# Patient Record
Sex: FEMALE | Race: WHITE | Employment: OTHER
[De-identification: names, ages, dates, MRNs, and addresses within clinical notes are randomized per-mention and may not be internally consistent; named-entity substitution may affect disease eponyms.]

---

## 2017-01-13 RX ORDER — OMEPRAZOLE 20 MG/1
CAPSULE, DELAYED RELEASE ORAL
Qty: 90 CAPSULE | Refills: 1 | Status: SHIPPED | OUTPATIENT
Start: 2017-01-13 | End: 2017-04-24

## 2017-02-01 ENCOUNTER — OFFICE VISIT (OUTPATIENT)
Facility: CLINIC | Age: 67
End: 2017-02-01

## 2017-02-01 VITALS
HEART RATE: 71 BPM | WEIGHT: 172.8 LBS | TEMPERATURE: 97.8 F | DIASTOLIC BLOOD PRESSURE: 88 MMHG | SYSTOLIC BLOOD PRESSURE: 168 MMHG | OXYGEN SATURATION: 97 % | BODY MASS INDEX: 33.75 KG/M2

## 2017-02-01 DIAGNOSIS — R41.3 MEMORY PROBLEM: Primary | ICD-10-CM

## 2017-02-01 DIAGNOSIS — I10 ESSENTIAL HYPERTENSION: Chronic | ICD-10-CM

## 2017-02-01 DIAGNOSIS — E66.9 OBESITY (BMI 30.0-34.9): ICD-10-CM

## 2017-02-01 DIAGNOSIS — E78.5 HYPERLIPIDEMIA, UNSPECIFIED HYPERLIPIDEMIA TYPE: Chronic | ICD-10-CM

## 2017-02-01 DIAGNOSIS — R35.0 URINARY FREQUENCY: ICD-10-CM

## 2017-02-01 DIAGNOSIS — K21.9 GASTROESOPHAGEAL REFLUX DISEASE WITHOUT ESOPHAGITIS: Chronic | ICD-10-CM

## 2017-02-01 DIAGNOSIS — N30.00 ACUTE CYSTITIS WITHOUT HEMATURIA: ICD-10-CM

## 2017-02-01 DIAGNOSIS — E55.9 VITAMIN D DEFICIENCY: ICD-10-CM

## 2017-02-01 LAB
BILIRUBIN, POC: ABNORMAL
BLOOD URINE, POC: ABNORMAL
CLARITY, POC: CLEAR
COLOR, POC: YELLOW
GLUCOSE URINE, POC: ABNORMAL
KETONES, POC: ABNORMAL
LEUKOCYTE EST, POC: ABNORMAL
NITRITE, POC: ABNORMAL
PH, POC: 5
PROTEIN, POC: ABNORMAL
SPECIFIC GRAVITY, POC: 1.01
UROBILINOGEN, POC: ABNORMAL

## 2017-02-01 PROCEDURE — 3017F COLORECTAL CA SCREEN DOC REV: CPT | Performed by: FAMILY MEDICINE

## 2017-02-01 PROCEDURE — 1036F TOBACCO NON-USER: CPT | Performed by: FAMILY MEDICINE

## 2017-02-01 PROCEDURE — G8427 DOCREV CUR MEDS BY ELIG CLIN: HCPCS | Performed by: FAMILY MEDICINE

## 2017-02-01 PROCEDURE — G8400 PT W/DXA NO RESULTS DOC: HCPCS | Performed by: FAMILY MEDICINE

## 2017-02-01 PROCEDURE — G8419 CALC BMI OUT NRM PARAM NOF/U: HCPCS | Performed by: FAMILY MEDICINE

## 2017-02-01 PROCEDURE — 4040F PNEUMOC VAC/ADMIN/RCVD: CPT | Performed by: FAMILY MEDICINE

## 2017-02-01 PROCEDURE — G8484 FLU IMMUNIZE NO ADMIN: HCPCS | Performed by: FAMILY MEDICINE

## 2017-02-01 PROCEDURE — 81002 URINALYSIS NONAUTO W/O SCOPE: CPT | Performed by: FAMILY MEDICINE

## 2017-02-01 PROCEDURE — 3014F SCREEN MAMMO DOC REV: CPT | Performed by: FAMILY MEDICINE

## 2017-02-01 PROCEDURE — 1123F ACP DISCUSS/DSCN MKR DOCD: CPT | Performed by: FAMILY MEDICINE

## 2017-02-01 PROCEDURE — 1090F PRES/ABSN URINE INCON ASSESS: CPT | Performed by: FAMILY MEDICINE

## 2017-02-01 PROCEDURE — 99215 OFFICE O/P EST HI 40 MIN: CPT | Performed by: FAMILY MEDICINE

## 2017-02-01 RX ORDER — NITROFURANTOIN 25; 75 MG/1; MG/1
100 CAPSULE ORAL 2 TIMES DAILY
Qty: 20 CAPSULE | Refills: 0 | Status: SHIPPED | OUTPATIENT
Start: 2017-02-01 | End: 2017-02-11

## 2017-02-10 ENCOUNTER — OFFICE VISIT (OUTPATIENT)
Dept: PSYCHOLOGY | Facility: CLINIC | Age: 67
End: 2017-02-10

## 2017-02-10 DIAGNOSIS — F43.22 ADJUSTMENT DISORDER WITH ANXIETY: Primary | ICD-10-CM

## 2017-02-10 PROCEDURE — 90791 PSYCH DIAGNOSTIC EVALUATION: CPT | Performed by: SOCIAL WORKER

## 2017-02-10 ASSESSMENT — PATIENT HEALTH QUESTIONNAIRE - PHQ9
SUM OF ALL RESPONSES TO PHQ9 QUESTIONS 1 & 2: 2
8. MOVING OR SPEAKING SO SLOWLY THAT OTHER PEOPLE COULD HAVE NOTICED. OR THE OPPOSITE, BEING SO FIGETY OR RESTLESS THAT YOU HAVE BEEN MOVING AROUND A LOT MORE THAN USUAL: 3
6. FEELING BAD ABOUT YOURSELF - OR THAT YOU ARE A FAILURE OR HAVE LET YOURSELF OR YOUR FAMILY DOWN: 0
5. POOR APPETITE OR OVEREATING: 0
4. FEELING TIRED OR HAVING LITTLE ENERGY: 0
2. FEELING DOWN, DEPRESSED OR HOPELESS: 0
SUM OF ALL RESPONSES TO PHQ QUESTIONS 1-9: 10
3. TROUBLE FALLING OR STAYING ASLEEP: 2
1. LITTLE INTEREST OR PLEASURE IN DOING THINGS: 2
7. TROUBLE CONCENTRATING ON THINGS, SUCH AS READING THE NEWSPAPER OR WATCHING TELEVISION: 3
9. THOUGHTS THAT YOU WOULD BE BETTER OFF DEAD, OR OF HURTING YOURSELF: 0
10. IF YOU CHECKED OFF ANY PROBLEMS, HOW DIFFICULT HAVE THESE PROBLEMS MADE IT FOR YOU TO DO YOUR WORK, TAKE CARE OF THINGS AT HOME, OR GET ALONG WITH OTHER PEOPLE: 2

## 2017-02-13 ENCOUNTER — OFFICE VISIT (OUTPATIENT)
Dept: NEUROLOGY | Facility: CLINIC | Age: 67
End: 2017-02-13

## 2017-02-13 VITALS
DIASTOLIC BLOOD PRESSURE: 92 MMHG | BODY MASS INDEX: 30.74 KG/M2 | HEART RATE: 66 BPM | SYSTOLIC BLOOD PRESSURE: 158 MMHG | HEIGHT: 63 IN | WEIGHT: 173.5 LBS

## 2017-02-13 DIAGNOSIS — J38.3 SPASMODIC DYSPHONIA: Primary | ICD-10-CM

## 2017-02-13 DIAGNOSIS — F02.80 DEMENTIA ASSOCIATED WITH OTHER UNDERLYING DISEASE WITHOUT BEHAVIORAL DISTURBANCE (HCC): ICD-10-CM

## 2017-02-13 PROCEDURE — 99204 OFFICE O/P NEW MOD 45 MIN: CPT | Performed by: PSYCHIATRY & NEUROLOGY

## 2017-02-13 PROCEDURE — G8484 FLU IMMUNIZE NO ADMIN: HCPCS | Performed by: PSYCHIATRY & NEUROLOGY

## 2017-02-13 PROCEDURE — G8417 CALC BMI ABV UP PARAM F/U: HCPCS | Performed by: PSYCHIATRY & NEUROLOGY

## 2017-02-13 PROCEDURE — 3014F SCREEN MAMMO DOC REV: CPT | Performed by: PSYCHIATRY & NEUROLOGY

## 2017-02-13 PROCEDURE — 1036F TOBACCO NON-USER: CPT | Performed by: PSYCHIATRY & NEUROLOGY

## 2017-02-13 PROCEDURE — 4040F PNEUMOC VAC/ADMIN/RCVD: CPT | Performed by: PSYCHIATRY & NEUROLOGY

## 2017-02-13 PROCEDURE — G8427 DOCREV CUR MEDS BY ELIG CLIN: HCPCS | Performed by: PSYCHIATRY & NEUROLOGY

## 2017-02-13 PROCEDURE — 3017F COLORECTAL CA SCREEN DOC REV: CPT | Performed by: PSYCHIATRY & NEUROLOGY

## 2017-02-13 PROCEDURE — 1090F PRES/ABSN URINE INCON ASSESS: CPT | Performed by: PSYCHIATRY & NEUROLOGY

## 2017-02-13 PROCEDURE — G8400 PT W/DXA NO RESULTS DOC: HCPCS | Performed by: PSYCHIATRY & NEUROLOGY

## 2017-02-13 PROCEDURE — 1123F ACP DISCUSS/DSCN MKR DOCD: CPT | Performed by: PSYCHIATRY & NEUROLOGY

## 2017-02-21 ENCOUNTER — HOSPITAL ENCOUNTER (OUTPATIENT)
Age: 67
Setting detail: SPECIMEN
Discharge: HOME | End: 2017-02-21
Payer: MEDICARE

## 2017-02-21 ENCOUNTER — HOSPITAL ENCOUNTER (OUTPATIENT)
Dept: MRI IMAGING | Facility: CLINIC | Age: 67
Discharge: HOME | End: 2017-02-21
Payer: MEDICARE

## 2017-02-21 DIAGNOSIS — F02.80 DEMENTIA ASSOCIATED WITH OTHER UNDERLYING DISEASE WITHOUT BEHAVIORAL DISTURBANCE (HCC): ICD-10-CM

## 2017-02-21 LAB
FOLATE: >20 NG/ML
SEDIMENTATION RATE, ERYTHROCYTE: 5 MM (ref 0–20)
T. PALLIDUM, IGG: NONREACTIVE
TSH SERPL DL<=0.05 MIU/L-ACNC: 5.95 MIU/L (ref 0.3–5)
VITAMIN B-12: 557 PG/ML (ref 211–946)

## 2017-02-21 PROCEDURE — 70551 MRI BRAIN STEM W/O DYE: CPT

## 2017-02-24 ENCOUNTER — OFFICE VISIT (OUTPATIENT)
Dept: PSYCHOLOGY | Facility: CLINIC | Age: 67
End: 2017-02-24

## 2017-02-24 DIAGNOSIS — F43.22 ADJUSTMENT DISORDER WITH ANXIETY: Primary | ICD-10-CM

## 2017-02-24 PROCEDURE — 90832 PSYTX W PT 30 MINUTES: CPT | Performed by: SOCIAL WORKER

## 2017-02-27 ENCOUNTER — HOSPITAL ENCOUNTER (OUTPATIENT)
Dept: NEUROLOGY | Age: 67
Discharge: HOME | End: 2017-02-27
Payer: MEDICARE

## 2017-02-27 ENCOUNTER — TELEPHONE (OUTPATIENT)
Facility: CLINIC | Age: 67
End: 2017-02-27

## 2017-02-27 PROCEDURE — 95819 EEG AWAKE AND ASLEEP: CPT

## 2017-03-08 ENCOUNTER — TELEPHONE (OUTPATIENT)
Dept: NEUROLOGY | Facility: CLINIC | Age: 67
End: 2017-03-08

## 2017-03-09 ENCOUNTER — OFFICE VISIT (OUTPATIENT)
Dept: PSYCHOLOGY | Facility: CLINIC | Age: 67
End: 2017-03-09

## 2017-03-09 DIAGNOSIS — F43.22 ADJUSTMENT DISORDER WITH ANXIETY: Primary | ICD-10-CM

## 2017-03-09 DIAGNOSIS — F03.90 DEMENTIA WITHOUT BEHAVIORAL DISTURBANCE, UNSPECIFIED DEMENTIA TYPE: ICD-10-CM

## 2017-03-09 PROCEDURE — 90832 PSYTX W PT 30 MINUTES: CPT | Performed by: SOCIAL WORKER

## 2017-03-16 DIAGNOSIS — F02.80 DEMENTIA ASSOCIATED WITH OTHER UNDERLYING DISEASE WITHOUT BEHAVIORAL DISTURBANCE (HCC): ICD-10-CM

## 2017-03-20 ENCOUNTER — HOSPITAL ENCOUNTER (OUTPATIENT)
Age: 67
Setting detail: SPECIMEN
Discharge: HOME | End: 2017-03-20
Payer: MEDICARE

## 2017-03-20 DIAGNOSIS — R79.89 ELEVATED TSH: ICD-10-CM

## 2017-03-20 LAB
THYROXINE, FREE: 0.95 NG/DL (ref 0.93–1.7)
TSH SERPL DL<=0.05 MIU/L-ACNC: 6.21 MIU/L (ref 0.3–5)

## 2017-03-25 PROBLEM — E03.9 HYPOTHYROIDISM (ACQUIRED): Status: ACTIVE | Noted: 2017-03-25

## 2017-04-13 ENCOUNTER — TELEPHONE (OUTPATIENT)
Dept: NEUROLOGY | Age: 67
End: 2017-04-13

## 2017-04-13 RX ORDER — DONEPEZIL HYDROCHLORIDE 5 MG/1
TABLET, FILM COATED ORAL
Qty: 30 TABLET | Refills: 1 | Status: SHIPPED | OUTPATIENT
Start: 2017-04-13 | End: 2017-06-29 | Stop reason: ALTCHOICE

## 2017-04-24 ENCOUNTER — OFFICE VISIT (OUTPATIENT)
Dept: NEUROLOGY | Age: 67
End: 2017-04-24
Payer: MEDICARE

## 2017-04-24 VITALS
SYSTOLIC BLOOD PRESSURE: 141 MMHG | DIASTOLIC BLOOD PRESSURE: 79 MMHG | HEART RATE: 59 BPM | HEIGHT: 61 IN | WEIGHT: 182 LBS | BODY MASS INDEX: 34.36 KG/M2

## 2017-04-24 DIAGNOSIS — J38.3 SPASMODIC DYSPHONIA: ICD-10-CM

## 2017-04-24 DIAGNOSIS — G30.8 ALZHEIMER'S DISEASE OF OTHER ONSET WITHOUT BEHAVIORAL DISTURBANCE: Primary | ICD-10-CM

## 2017-04-24 DIAGNOSIS — F02.80 ALZHEIMER'S DISEASE OF OTHER ONSET WITHOUT BEHAVIORAL DISTURBANCE: Primary | ICD-10-CM

## 2017-04-24 PROCEDURE — 4040F PNEUMOC VAC/ADMIN/RCVD: CPT | Performed by: PSYCHIATRY & NEUROLOGY

## 2017-04-24 PROCEDURE — G8400 PT W/DXA NO RESULTS DOC: HCPCS | Performed by: PSYCHIATRY & NEUROLOGY

## 2017-04-24 PROCEDURE — 1123F ACP DISCUSS/DSCN MKR DOCD: CPT | Performed by: PSYCHIATRY & NEUROLOGY

## 2017-04-24 PROCEDURE — 99214 OFFICE O/P EST MOD 30 MIN: CPT | Performed by: PSYCHIATRY & NEUROLOGY

## 2017-04-24 PROCEDURE — 3017F COLORECTAL CA SCREEN DOC REV: CPT | Performed by: PSYCHIATRY & NEUROLOGY

## 2017-04-24 PROCEDURE — 1036F TOBACCO NON-USER: CPT | Performed by: PSYCHIATRY & NEUROLOGY

## 2017-04-24 PROCEDURE — 1090F PRES/ABSN URINE INCON ASSESS: CPT | Performed by: PSYCHIATRY & NEUROLOGY

## 2017-04-24 PROCEDURE — G8417 CALC BMI ABV UP PARAM F/U: HCPCS | Performed by: PSYCHIATRY & NEUROLOGY

## 2017-04-24 PROCEDURE — G8427 DOCREV CUR MEDS BY ELIG CLIN: HCPCS | Performed by: PSYCHIATRY & NEUROLOGY

## 2017-04-24 PROCEDURE — 3014F SCREEN MAMMO DOC REV: CPT | Performed by: PSYCHIATRY & NEUROLOGY

## 2017-04-24 RX ORDER — DONEPEZIL HYDROCHLORIDE 10 MG/1
10 TABLET, FILM COATED ORAL NIGHTLY
Qty: 30 TABLET | Refills: 0 | Status: SHIPPED | OUTPATIENT
Start: 2017-04-24 | End: 2017-06-29 | Stop reason: ALTCHOICE

## 2017-04-24 RX ORDER — DONEPEZIL HYDROCHLORIDE 10 MG/1
10 TABLET, FILM COATED ORAL NIGHTLY
Qty: 90 TABLET | Refills: 3 | Status: SHIPPED | OUTPATIENT
Start: 2017-04-24 | End: 2018-04-10 | Stop reason: SDUPTHER

## 2017-04-27 ENCOUNTER — OFFICE VISIT (OUTPATIENT)
Dept: PSYCHOLOGY | Age: 67
End: 2017-04-27
Payer: MEDICARE

## 2017-04-27 DIAGNOSIS — F43.22 ADJUSTMENT DISORDER WITH ANXIETY: Primary | ICD-10-CM

## 2017-04-27 PROCEDURE — 90832 PSYTX W PT 30 MINUTES: CPT | Performed by: SOCIAL WORKER

## 2017-05-11 ENCOUNTER — HOSPITAL ENCOUNTER (OUTPATIENT)
Age: 67
Setting detail: SPECIMEN
Discharge: HOME | End: 2017-05-11
Payer: MEDICARE

## 2017-05-11 DIAGNOSIS — E03.9 HYPOTHYROIDISM (ACQUIRED): ICD-10-CM

## 2017-05-11 LAB
THYROXINE, FREE: 1.22 NG/DL (ref 0.93–1.7)
TSH SERPL DL<=0.05 MIU/L-ACNC: 2.82 MIU/L (ref 0.3–5)

## 2017-05-24 DIAGNOSIS — F41.9 ANXIETY: ICD-10-CM

## 2017-05-24 DIAGNOSIS — F51.04 PSYCHOPHYSIOLOGICAL INSOMNIA: ICD-10-CM

## 2017-05-24 DIAGNOSIS — F32.A DEPRESSION: ICD-10-CM

## 2017-05-24 DIAGNOSIS — F43.10 PTSD (POST-TRAUMATIC STRESS DISORDER): ICD-10-CM

## 2017-05-24 RX ORDER — TRAZODONE HYDROCHLORIDE 50 MG/1
TABLET ORAL
Qty: 30 TABLET | Refills: 2 | Status: SHIPPED | OUTPATIENT
Start: 2017-05-24 | End: 2017-08-09 | Stop reason: SDUPTHER

## 2017-05-30 DIAGNOSIS — I10 ESSENTIAL HYPERTENSION: ICD-10-CM

## 2017-05-30 PROBLEM — G30.1 LATE ONSET ALZHEIMER'S DISEASE WITHOUT BEHAVIORAL DISTURBANCE (HCC): Status: ACTIVE | Noted: 2017-05-30

## 2017-05-30 PROBLEM — F02.80 LATE ONSET ALZHEIMER'S DISEASE WITHOUT BEHAVIORAL DISTURBANCE (HCC): Status: ACTIVE | Noted: 2017-05-30

## 2017-05-30 RX ORDER — LISINOPRIL 20 MG/1
TABLET ORAL
Qty: 90 TABLET | Refills: 0 | Status: SHIPPED | OUTPATIENT
Start: 2017-05-30 | End: 2017-08-28 | Stop reason: SDUPTHER

## 2017-06-08 ENCOUNTER — HOSPITAL ENCOUNTER (OUTPATIENT)
Dept: WOMENS IMAGING | Age: 67
Discharge: HOME | End: 2017-06-08
Payer: MEDICARE

## 2017-06-08 DIAGNOSIS — Z12.31 ENCOUNTER FOR SCREENING MAMMOGRAM FOR BREAST CANCER: ICD-10-CM

## 2017-06-08 PROCEDURE — 77063 BREAST TOMOSYNTHESIS BI: CPT

## 2017-06-13 ENCOUNTER — HOSPITAL ENCOUNTER (OUTPATIENT)
Dept: WOMENS IMAGING | Age: 67
Discharge: HOME | End: 2017-06-13
Payer: MEDICARE

## 2017-06-13 DIAGNOSIS — R92.8 ABNORMAL MAMMOGRAM: ICD-10-CM

## 2017-06-13 PROCEDURE — 76642 ULTRASOUND BREAST LIMITED: CPT

## 2017-06-29 ENCOUNTER — OFFICE VISIT (OUTPATIENT)
Dept: NEUROLOGY | Age: 67
End: 2017-06-29
Payer: MEDICARE

## 2017-06-29 VITALS
BODY MASS INDEX: 32.76 KG/M2 | HEIGHT: 62 IN | SYSTOLIC BLOOD PRESSURE: 179 MMHG | HEART RATE: 61 BPM | DIASTOLIC BLOOD PRESSURE: 90 MMHG | WEIGHT: 178 LBS

## 2017-06-29 DIAGNOSIS — G30.8 ALZHEIMER'S DISEASE OF OTHER ONSET WITHOUT BEHAVIORAL DISTURBANCE: Primary | ICD-10-CM

## 2017-06-29 DIAGNOSIS — J38.3 SPASMODIC DYSPHONIA: ICD-10-CM

## 2017-06-29 DIAGNOSIS — F02.80 ALZHEIMER'S DISEASE OF OTHER ONSET WITHOUT BEHAVIORAL DISTURBANCE: Primary | ICD-10-CM

## 2017-06-29 PROCEDURE — 4040F PNEUMOC VAC/ADMIN/RCVD: CPT | Performed by: PSYCHIATRY & NEUROLOGY

## 2017-06-29 PROCEDURE — 3017F COLORECTAL CA SCREEN DOC REV: CPT | Performed by: PSYCHIATRY & NEUROLOGY

## 2017-06-29 PROCEDURE — 99214 OFFICE O/P EST MOD 30 MIN: CPT | Performed by: PSYCHIATRY & NEUROLOGY

## 2017-06-29 PROCEDURE — G8427 DOCREV CUR MEDS BY ELIG CLIN: HCPCS | Performed by: PSYCHIATRY & NEUROLOGY

## 2017-06-29 PROCEDURE — 1090F PRES/ABSN URINE INCON ASSESS: CPT | Performed by: PSYCHIATRY & NEUROLOGY

## 2017-06-29 PROCEDURE — 1123F ACP DISCUSS/DSCN MKR DOCD: CPT | Performed by: PSYCHIATRY & NEUROLOGY

## 2017-06-29 PROCEDURE — 1036F TOBACCO NON-USER: CPT | Performed by: PSYCHIATRY & NEUROLOGY

## 2017-06-29 PROCEDURE — 3014F SCREEN MAMMO DOC REV: CPT | Performed by: PSYCHIATRY & NEUROLOGY

## 2017-06-29 PROCEDURE — G8417 CALC BMI ABV UP PARAM F/U: HCPCS | Performed by: PSYCHIATRY & NEUROLOGY

## 2017-06-29 PROCEDURE — G8400 PT W/DXA NO RESULTS DOC: HCPCS | Performed by: PSYCHIATRY & NEUROLOGY

## 2017-06-29 RX ORDER — MEMANTINE HYDROCHLORIDE 5 MG/1
TABLET ORAL
Qty: 42 TABLET | Refills: 0 | Status: SHIPPED | OUTPATIENT
Start: 2017-06-29 | End: 2017-10-10 | Stop reason: ALTCHOICE

## 2017-06-29 RX ORDER — MEMANTINE HYDROCHLORIDE 10 MG/1
10 TABLET ORAL 2 TIMES DAILY
Qty: 180 TABLET | Refills: 3 | Status: SHIPPED | OUTPATIENT
Start: 2017-06-29 | End: 2017-08-28 | Stop reason: SDUPTHER

## 2017-07-21 ENCOUNTER — OFFICE VISIT (OUTPATIENT)
Dept: GASTROENTEROLOGY | Age: 67
End: 2017-07-21
Payer: MEDICARE

## 2017-07-21 VITALS
HEART RATE: 59 BPM | WEIGHT: 175.6 LBS | DIASTOLIC BLOOD PRESSURE: 84 MMHG | BODY MASS INDEX: 33.15 KG/M2 | HEIGHT: 61 IN | OXYGEN SATURATION: 94 % | SYSTOLIC BLOOD PRESSURE: 168 MMHG | TEMPERATURE: 97 F

## 2017-07-21 DIAGNOSIS — K21.9 GASTROESOPHAGEAL REFLUX DISEASE, ESOPHAGITIS PRESENCE NOT SPECIFIED: Primary | ICD-10-CM

## 2017-07-21 DIAGNOSIS — R19.5 OCCULT BLOOD IN STOOLS: ICD-10-CM

## 2017-07-21 DIAGNOSIS — R19.7 DIARRHEA, UNSPECIFIED TYPE: ICD-10-CM

## 2017-07-21 PROCEDURE — G8417 CALC BMI ABV UP PARAM F/U: HCPCS | Performed by: INTERNAL MEDICINE

## 2017-07-21 PROCEDURE — 1036F TOBACCO NON-USER: CPT | Performed by: INTERNAL MEDICINE

## 2017-07-21 PROCEDURE — G8427 DOCREV CUR MEDS BY ELIG CLIN: HCPCS | Performed by: INTERNAL MEDICINE

## 2017-07-21 PROCEDURE — 3014F SCREEN MAMMO DOC REV: CPT | Performed by: INTERNAL MEDICINE

## 2017-07-21 PROCEDURE — 1090F PRES/ABSN URINE INCON ASSESS: CPT | Performed by: INTERNAL MEDICINE

## 2017-07-21 PROCEDURE — 99214 OFFICE O/P EST MOD 30 MIN: CPT | Performed by: INTERNAL MEDICINE

## 2017-07-21 PROCEDURE — 3017F COLORECTAL CA SCREEN DOC REV: CPT | Performed by: INTERNAL MEDICINE

## 2017-07-21 PROCEDURE — 4040F PNEUMOC VAC/ADMIN/RCVD: CPT | Performed by: INTERNAL MEDICINE

## 2017-07-21 ASSESSMENT — ENCOUNTER SYMPTOMS
BLOOD IN STOOL: 1
ABDOMINAL PAIN: 0
NAUSEA: 0
ANAL BLEEDING: 1
SINUS PRESSURE: 0
RESPIRATORY NEGATIVE: 1
CONSTIPATION: 0
TROUBLE SWALLOWING: 1
VOMITING: 0
DIARRHEA: 1
RECTAL PAIN: 0

## 2017-08-24 DIAGNOSIS — I10 ESSENTIAL HYPERTENSION: ICD-10-CM

## 2017-08-24 RX ORDER — ATENOLOL 50 MG/1
TABLET ORAL
Qty: 180 TABLET | Refills: 0 | Status: SHIPPED | OUTPATIENT
Start: 2017-08-24 | End: 2018-02-20 | Stop reason: SDUPTHER

## 2017-08-28 ENCOUNTER — OFFICE VISIT (OUTPATIENT)
Dept: GASTROENTEROLOGY | Age: 67
End: 2017-08-28
Payer: MEDICARE

## 2017-08-28 VITALS
HEART RATE: 61 BPM | DIASTOLIC BLOOD PRESSURE: 85 MMHG | OXYGEN SATURATION: 96 % | SYSTOLIC BLOOD PRESSURE: 170 MMHG | TEMPERATURE: 97.4 F | BODY MASS INDEX: 34.26 KG/M2 | WEIGHT: 174.5 LBS | HEIGHT: 60 IN

## 2017-08-28 DIAGNOSIS — K44.9 HIATAL HERNIA: ICD-10-CM

## 2017-08-28 DIAGNOSIS — K25.3 ACUTE GASTRIC ULCER WITHOUT HEMORRHAGE OR PERFORATION: ICD-10-CM

## 2017-08-28 DIAGNOSIS — Z86.010 HISTORY OF COLON POLYPS: ICD-10-CM

## 2017-08-28 DIAGNOSIS — K21.9 GASTROESOPHAGEAL REFLUX DISEASE WITHOUT ESOPHAGITIS: Primary | Chronic | ICD-10-CM

## 2017-08-28 DIAGNOSIS — D48.9 VILLOUS ADENOMA: ICD-10-CM

## 2017-08-28 PROBLEM — K63.5 COLON POLYPS: Status: ACTIVE | Noted: 2017-08-01

## 2017-08-28 PROCEDURE — G8400 PT W/DXA NO RESULTS DOC: HCPCS | Performed by: INTERNAL MEDICINE

## 2017-08-28 PROCEDURE — 3017F COLORECTAL CA SCREEN DOC REV: CPT | Performed by: INTERNAL MEDICINE

## 2017-08-28 PROCEDURE — 1090F PRES/ABSN URINE INCON ASSESS: CPT | Performed by: INTERNAL MEDICINE

## 2017-08-28 PROCEDURE — 4040F PNEUMOC VAC/ADMIN/RCVD: CPT | Performed by: INTERNAL MEDICINE

## 2017-08-28 PROCEDURE — 99214 OFFICE O/P EST MOD 30 MIN: CPT | Performed by: INTERNAL MEDICINE

## 2017-08-28 PROCEDURE — 1036F TOBACCO NON-USER: CPT | Performed by: INTERNAL MEDICINE

## 2017-08-28 PROCEDURE — 1123F ACP DISCUSS/DSCN MKR DOCD: CPT | Performed by: INTERNAL MEDICINE

## 2017-08-28 PROCEDURE — G8427 DOCREV CUR MEDS BY ELIG CLIN: HCPCS | Performed by: INTERNAL MEDICINE

## 2017-08-28 PROCEDURE — G8417 CALC BMI ABV UP PARAM F/U: HCPCS | Performed by: INTERNAL MEDICINE

## 2017-08-28 PROCEDURE — 3014F SCREEN MAMMO DOC REV: CPT | Performed by: INTERNAL MEDICINE

## 2017-08-28 RX ORDER — OMEPRAZOLE 20 MG/1
20 CAPSULE, DELAYED RELEASE ORAL DAILY
COMMUNITY
End: 2017-08-28 | Stop reason: SDUPTHER

## 2017-08-28 RX ORDER — POLYETHYLENE GLYCOL 3350 17 G/17G
POWDER, FOR SOLUTION ORAL
Qty: 255 G | Refills: 0 | Status: SHIPPED | OUTPATIENT
Start: 2017-08-28 | End: 2017-09-27

## 2017-08-28 RX ORDER — OMEPRAZOLE 40 MG/1
40 CAPSULE, DELAYED RELEASE ORAL DAILY
Qty: 90 CAPSULE | Refills: 1 | Status: SHIPPED | OUTPATIENT
Start: 2017-08-28 | End: 2018-05-20 | Stop reason: SDUPTHER

## 2017-08-28 ASSESSMENT — ENCOUNTER SYMPTOMS
BACK PAIN: 0
CONSTIPATION: 0
COUGH: 0
ANAL BLEEDING: 0
RECTAL PAIN: 0
ABDOMINAL DISTENTION: 0
SHORTNESS OF BREATH: 0
SINUS PRESSURE: 0
STRIDOR: 0
DIARRHEA: 0
SORE THROAT: 0
VOICE CHANGE: 1
RESPIRATORY NEGATIVE: 1
VOMITING: 0
NAUSEA: 0
RHINORRHEA: 0
BLOOD IN STOOL: 0
GASTROINTESTINAL NEGATIVE: 1
ABDOMINAL PAIN: 0
FACIAL SWELLING: 0
TROUBLE SWALLOWING: 0

## 2017-09-12 DIAGNOSIS — K21.9 GASTROESOPHAGEAL REFLUX DISEASE, ESOPHAGITIS PRESENCE NOT SPECIFIED: ICD-10-CM

## 2017-09-12 DIAGNOSIS — R19.5 OCCULT BLOOD IN STOOLS: ICD-10-CM

## 2017-09-12 DIAGNOSIS — R19.7 DIARRHEA, UNSPECIFIED TYPE: ICD-10-CM

## 2017-10-10 ENCOUNTER — OFFICE VISIT (OUTPATIENT)
Dept: NEUROLOGY | Age: 67
End: 2017-10-10
Payer: MEDICARE

## 2017-10-10 VITALS
HEIGHT: 60 IN | DIASTOLIC BLOOD PRESSURE: 75 MMHG | HEART RATE: 69 BPM | BODY MASS INDEX: 33.61 KG/M2 | WEIGHT: 171.2 LBS | SYSTOLIC BLOOD PRESSURE: 153 MMHG

## 2017-10-10 DIAGNOSIS — J38.3 SPASMODIC DYSPHONIA: ICD-10-CM

## 2017-10-10 DIAGNOSIS — F02.80 DEMENTIA ASSOCIATED WITH OTHER UNDERLYING DISEASE WITHOUT BEHAVIORAL DISTURBANCE (HCC): Primary | ICD-10-CM

## 2017-10-10 PROCEDURE — 1036F TOBACCO NON-USER: CPT | Performed by: PSYCHIATRY & NEUROLOGY

## 2017-10-10 PROCEDURE — G8427 DOCREV CUR MEDS BY ELIG CLIN: HCPCS | Performed by: PSYCHIATRY & NEUROLOGY

## 2017-10-10 PROCEDURE — G8400 PT W/DXA NO RESULTS DOC: HCPCS | Performed by: PSYCHIATRY & NEUROLOGY

## 2017-10-10 PROCEDURE — 99214 OFFICE O/P EST MOD 30 MIN: CPT | Performed by: PSYCHIATRY & NEUROLOGY

## 2017-10-10 PROCEDURE — 1090F PRES/ABSN URINE INCON ASSESS: CPT | Performed by: PSYCHIATRY & NEUROLOGY

## 2017-10-10 PROCEDURE — G8484 FLU IMMUNIZE NO ADMIN: HCPCS | Performed by: PSYCHIATRY & NEUROLOGY

## 2017-10-10 PROCEDURE — 3014F SCREEN MAMMO DOC REV: CPT | Performed by: PSYCHIATRY & NEUROLOGY

## 2017-10-10 PROCEDURE — 1123F ACP DISCUSS/DSCN MKR DOCD: CPT | Performed by: PSYCHIATRY & NEUROLOGY

## 2017-10-10 PROCEDURE — G8417 CALC BMI ABV UP PARAM F/U: HCPCS | Performed by: PSYCHIATRY & NEUROLOGY

## 2017-10-10 PROCEDURE — 3017F COLORECTAL CA SCREEN DOC REV: CPT | Performed by: PSYCHIATRY & NEUROLOGY

## 2017-10-10 PROCEDURE — 4040F PNEUMOC VAC/ADMIN/RCVD: CPT | Performed by: PSYCHIATRY & NEUROLOGY

## 2017-10-10 RX ORDER — DONEPEZIL HYDROCHLORIDE 10 MG/1
10 TABLET, FILM COATED ORAL NIGHTLY
Qty: 90 TABLET | Refills: 3 | Status: SHIPPED | OUTPATIENT
Start: 2017-10-10 | End: 2018-04-10 | Stop reason: SDUPTHER

## 2017-10-10 RX ORDER — MEMANTINE HYDROCHLORIDE 10 MG/1
10 TABLET ORAL 2 TIMES DAILY
COMMUNITY
End: 2019-06-23 | Stop reason: SDUPTHER

## 2017-10-10 NOTE — PROGRESS NOTES
spasmodic dysphonia. Following serial commands . There is strained speech with minor dysnomia with normal repetition   Cranial nerve 2 normal acuety and visual fields  Cranial nerve 3, 4 and 6 . Extraocular muscles are intact . Pupils are equal and reactive   Cranial nerve 5 . Normal strength of masseter and temporalis . Intact corneal reflex. Normal facial sensation  Cranial nerve 7 normal exam   Cranial nerve 8. Grossly intact hearing   Cranial nerve 9 and 10. Symmetric palate elevation   Cranial nerve 11 , 5 out of 5 strength   Cranial Nerve 12 midline tongue . No atrophy  Sensation . Normal pinprick and light touch   Deep Tendon Reflexes normal  Plantar response flexor bilaterally    Assessment:      1. Dementia associated with other underlying disease without behavioral disturbance    2.  Spasmodic dysphonia    Patient continues with cognitive decline to remain on current medication regimen      Plan:      As above

## 2017-10-10 NOTE — LETTER
[facility] Neurology Specialist  [facility] [place] [address]  Phone: [phone]  Fax: [phone]    Rashid Keller MD        October 11, 2017       Patient: Victorina Holt   MR Number: [id number]   YOB: 1950   Date of Visit: 10/10/2017       Dear Dr. Beatris Fox: Thank you for the request for consultation for [facility] . Below are the relevant portions of my assessment and plan of care. Subjective:      Patient ID: Victorina Holt is a 74 y.o. female. HPI    Active problem dementia alzheimer's in type adding namenda  to aricept . She has spasmodic dysphonia getting botox injections . The condition her  reports that she is tolerating namenda and aricept well. Despite these medications there has been some continued decline with trouble recognizing family members such as her children thinking her  is the the cook . She is laughing all the time with no yelling or behavioral disturbance  . She needs help to get dressed and bathed eating her own  . There are no hallucinations on behavioral issues . Diarrhea has eased since last seen  . She remains with botox for spasmodic dysphonia with last injection being 3 weeks having simple verbal response with family able to make out what she is saying  . There are no headaches or focal motor ,sensory or bulbar complaints . Her walking is slow and cautious with no falls not using walking aide. There is no depression . She has hearing loss wearing hearing aides . There is history of dementia with maternal uncle , and cousin . There is some urinary incontinence . Significant medications aspirin 81 mg po qd, aricept 10 mg po qd , namenda 10 mg po bid  . Testing L04 119 , folic acid > 20, ESR 5, treponemal Ab nonreactive , TSH 5.95 (0-5) . MRI of Head with mild chronic periventricular small vessel ischemia . Moderate cerebral atrophy .  EEG normal . Neuropsychological testing impaired range in every cognitive

## 2017-10-11 NOTE — COMMUNICATION BODY
Subjective:      Patient ID: Victorina Holt is a 74 y.o. female. HPI    Active problem dementia alzheimer's in type adding namenda  to aricept . She has spasmodic dysphonia getting botox injections . The condition her  reports that she is tolerating namenda and aricept well. Despite these medications there has been some continued decline with trouble recognizing family members such as her children thinking her  is the the cook . She is laughing all the time with no yelling or behavioral disturbance  . She needs help to get dressed and bathed eating her own  . There are no hallucinations on behavioral issues . Diarrhea has eased since last seen  . She remains with botox for spasmodic dysphonia with last injection being 3 weeks having simple verbal response with family able to make out what she is saying  . There are no headaches or focal motor ,sensory or bulbar complaints . Her walking is slow and cautious with no falls not using walking aide. There is no depression . She has hearing loss wearing hearing aides . There is history of dementia with maternal uncle , and cousin . There is some urinary incontinence . Significant medications aspirin 81 mg po qd, aricept 10 mg po qd , namenda 10 mg po bid  . Testing E11 448 , folic acid > 20, ESR 5, treponemal Ab nonreactive , TSH 5.95 (0-5) . MRI of Head with mild chronic periventricular small vessel ischemia . Moderate cerebral atrophy .  EEG normal . Neuropsychological testing impaired range in every cognitive realm concordant for alzheimer's dementia      Past Medical History:   Diagnosis Date    CAD (coronary artery disease)     Cataracts, bilateral     Colon polyps 08/2017    Dementia     Gastric ulcer 08/2017    GERD (gastroesophageal reflux disease) 12/16/2013    Hiatal hernia     History of colon polyps 08/14/2017    Hyperlipidemia 6/13/2013    Hypertension     Hypothyroidism (acquired) 3/25/2017    Laryngitis     MI (myocardial spasmodic dysphonia. Following serial commands . There is strained speech with minor dysnomia with normal repetition   Cranial nerve 2 normal acuety and visual fields  Cranial nerve 3, 4 and 6 . Extraocular muscles are intact . Pupils are equal and reactive   Cranial nerve 5 . Normal strength of masseter and temporalis . Intact corneal reflex. Normal facial sensation  Cranial nerve 7 normal exam   Cranial nerve 8. Grossly intact hearing   Cranial nerve 9 and 10. Symmetric palate elevation   Cranial nerve 11 , 5 out of 5 strength   Cranial Nerve 12 midline tongue . No atrophy  Sensation . Normal pinprick and light touch   Deep Tendon Reflexes normal  Plantar response flexor bilaterally    Assessment:      1. Dementia associated with other underlying disease without behavioral disturbance    2.  Spasmodic dysphonia    Patient continues with cognitive decline to remain on current medication regimen      Plan:      As above

## 2017-10-24 PROBLEM — K20.90 ESOPHAGITIS: Status: ACTIVE | Noted: 2017-10-24

## 2017-10-24 PROBLEM — D37.4 VILLOUS ADENOMA OF COLON: Status: ACTIVE | Noted: 2017-08-01

## 2017-10-24 PROBLEM — D12.6 TUBULAR ADENOMA OF COLON: Status: ACTIVE | Noted: 2017-10-19

## 2017-10-26 ENCOUNTER — OFFICE VISIT (OUTPATIENT)
Dept: GASTROENTEROLOGY | Age: 67
End: 2017-10-26
Payer: MEDICARE

## 2017-10-26 VITALS
TEMPERATURE: 94.9 F | HEIGHT: 60 IN | BODY MASS INDEX: 33.95 KG/M2 | HEART RATE: 55 BPM | SYSTOLIC BLOOD PRESSURE: 159 MMHG | OXYGEN SATURATION: 99 % | WEIGHT: 172.9 LBS | DIASTOLIC BLOOD PRESSURE: 83 MMHG

## 2017-10-26 DIAGNOSIS — D12.6 TUBULAR ADENOMA OF COLON: ICD-10-CM

## 2017-10-26 DIAGNOSIS — R19.7 DIARRHEA, UNSPECIFIED TYPE: ICD-10-CM

## 2017-10-26 DIAGNOSIS — D37.4 VILLOUS ADENOMA OF COLON: Primary | ICD-10-CM

## 2017-10-26 DIAGNOSIS — K21.9 GASTROESOPHAGEAL REFLUX DISEASE WITHOUT ESOPHAGITIS: Chronic | ICD-10-CM

## 2017-10-26 DIAGNOSIS — K63.5 POLYP OF COLON, UNSPECIFIED PART OF COLON, UNSPECIFIED TYPE: ICD-10-CM

## 2017-10-26 DIAGNOSIS — K20.90 ESOPHAGITIS: ICD-10-CM

## 2017-10-26 DIAGNOSIS — K44.9 HIATAL HERNIA: ICD-10-CM

## 2017-10-26 PROCEDURE — 4040F PNEUMOC VAC/ADMIN/RCVD: CPT | Performed by: INTERNAL MEDICINE

## 2017-10-26 PROCEDURE — 3017F COLORECTAL CA SCREEN DOC REV: CPT | Performed by: INTERNAL MEDICINE

## 2017-10-26 PROCEDURE — 99213 OFFICE O/P EST LOW 20 MIN: CPT | Performed by: INTERNAL MEDICINE

## 2017-10-26 PROCEDURE — 1090F PRES/ABSN URINE INCON ASSESS: CPT | Performed by: INTERNAL MEDICINE

## 2017-10-26 PROCEDURE — 3014F SCREEN MAMMO DOC REV: CPT | Performed by: INTERNAL MEDICINE

## 2017-10-26 PROCEDURE — G8417 CALC BMI ABV UP PARAM F/U: HCPCS | Performed by: INTERNAL MEDICINE

## 2017-10-26 PROCEDURE — 1123F ACP DISCUSS/DSCN MKR DOCD: CPT | Performed by: INTERNAL MEDICINE

## 2017-10-26 PROCEDURE — G8484 FLU IMMUNIZE NO ADMIN: HCPCS | Performed by: INTERNAL MEDICINE

## 2017-10-26 PROCEDURE — G8400 PT W/DXA NO RESULTS DOC: HCPCS | Performed by: INTERNAL MEDICINE

## 2017-10-26 PROCEDURE — G8427 DOCREV CUR MEDS BY ELIG CLIN: HCPCS | Performed by: INTERNAL MEDICINE

## 2017-10-26 PROCEDURE — 1036F TOBACCO NON-USER: CPT | Performed by: INTERNAL MEDICINE

## 2017-10-26 ASSESSMENT — ENCOUNTER SYMPTOMS
RESPIRATORY NEGATIVE: 1
ABDOMINAL DISTENTION: 0
RECTAL PAIN: 0
SORE THROAT: 0
ABDOMINAL PAIN: 0
VOICE CHANGE: 1
COUGH: 0
TROUBLE SWALLOWING: 0
DIARRHEA: 1
VOMITING: 0
SHORTNESS OF BREATH: 0
FACIAL SWELLING: 0
SINUS PAIN: 0
SINUS PRESSURE: 0
ANAL BLEEDING: 0
BLOOD IN STOOL: 0
RHINORRHEA: 0
NAUSEA: 0
CONSTIPATION: 0
STRIDOR: 0
BACK PAIN: 0

## 2017-10-26 NOTE — PROGRESS NOTES
Subjective:      Patient ID: Amaris Lynn is a 74 y.o. female. HPI   Dr. Adrien Min MD our mutual patient Amaris Lynn was seen  for   1. Villous adenoma of colon    2. Tubular adenoma of colon    3. Gastroesophageal reflux disease without esophagitis    4. Hiatal hernia    5. Esophagitis    6. Diarrhea, unspecified type    7. Polyp of colon, unspecified part of colon, unspecified type     . Patient seen along with her . Basing on the EGD that is done recently the ulcer at the GE junction healed. At present patient denies symptoms of GERD and dysphagia. Tolerating diet well. Colonoscopy revealed questionable hypertrophied mucosa at the previous polypectomy site in the anal rectal area. Surprisingly biopsies from this area revealed tubular adenoma. This is very flat area. At present patient is able to move bowels without issues. No signs of bleeding. Past Medical, Family, and Social History reviewed and does contribute to the patient presenting condition. patient\"s PMH/PSH,SH,PSYCH hx, MEDs, ALLERGIES, and ROS was all reviewed and updated ion the appropriate sections      Review of Systems   Constitutional: Negative. Negative for activity change, appetite change, chills, diaphoresis, fatigue, fever and unexpected weight change. HENT: Positive for dental problem and voice change (voice is very quiet). Negative for congestion, drooling, ear discharge, ear pain, facial swelling, hearing loss, mouth sores, nosebleeds, postnasal drip, rhinorrhea, sinus pain, sinus pressure, sneezing, sore throat, tinnitus and trouble swallowing. Eyes: Positive for visual disturbance (wears glasses ). Respiratory: Negative. Negative for cough, shortness of breath and stridor. Cardiovascular: Negative. Negative for chest pain, palpitations and leg swelling. Gastrointestinal: Positive for diarrhea (occasional).  Negative for abdominal distention, abdominal pain, anal bleeding, blood in rash noted. No erythema. Psychiatric: Thought content normal.   Nursing note and vitals reviewed. Assessment:      1. Villous adenoma of colon     2. Tubular adenoma of colon     3. Gastroesophageal reflux disease without esophagitis     4. Hiatal hernia     5. Esophagitis     6. Diarrhea, unspecified type     7. Polyp of colon, unspecified part of colon, unspecified type             Plan: At present her exam nonspecific. She does not have symptoms. She is advised high-fiber diet, fiber supplements and precautions to avoid constipation. Patient is advised to have sigmoidoscopy in the summer of 2018. After discussion, both patient and her  understood and agreed.

## 2017-12-26 ENCOUNTER — HOSPITAL ENCOUNTER (OUTPATIENT)
Dept: WOMENS IMAGING | Age: 67
Discharge: HOME | End: 2017-12-26
Payer: MEDICARE

## 2017-12-26 DIAGNOSIS — N60.02 BREAST CYST, LEFT: ICD-10-CM

## 2017-12-26 PROCEDURE — 76642 ULTRASOUND BREAST LIMITED: CPT

## 2018-04-10 ENCOUNTER — OFFICE VISIT (OUTPATIENT)
Dept: NEUROLOGY | Age: 68
End: 2018-04-10
Payer: COMMERCIAL

## 2018-04-10 VITALS
DIASTOLIC BLOOD PRESSURE: 88 MMHG | BODY MASS INDEX: 30.55 KG/M2 | HEART RATE: 76 BPM | WEIGHT: 155.6 LBS | HEIGHT: 60 IN | SYSTOLIC BLOOD PRESSURE: 138 MMHG

## 2018-04-10 DIAGNOSIS — F02.80 ALZHEIMER'S DISEASE OF OTHER ONSET WITHOUT BEHAVIORAL DISTURBANCE: Primary | ICD-10-CM

## 2018-04-10 DIAGNOSIS — G30.8 ALZHEIMER'S DISEASE OF OTHER ONSET WITHOUT BEHAVIORAL DISTURBANCE: Primary | ICD-10-CM

## 2018-04-10 DIAGNOSIS — J38.3 SPASMODIC DYSPHONIA: ICD-10-CM

## 2018-04-10 PROCEDURE — 99214 OFFICE O/P EST MOD 30 MIN: CPT | Performed by: PSYCHIATRY & NEUROLOGY

## 2018-04-10 RX ORDER — MEMANTINE HYDROCHLORIDE 10 MG/1
10 TABLET ORAL 2 TIMES DAILY
Qty: 180 TABLET | Refills: 3 | Status: SHIPPED | OUTPATIENT
Start: 2018-04-10 | End: 2018-06-04 | Stop reason: SDUPTHER

## 2018-04-10 RX ORDER — DONEPEZIL HYDROCHLORIDE 10 MG/1
10 TABLET, FILM COATED ORAL NIGHTLY
Qty: 90 TABLET | Refills: 3 | Status: SHIPPED | OUTPATIENT
Start: 2018-04-10 | End: 2018-12-06 | Stop reason: ALTCHOICE

## 2018-04-10 ASSESSMENT — ENCOUNTER SYMPTOMS
GASTROINTESTINAL NEGATIVE: 1
EYES NEGATIVE: 1
RESPIRATORY NEGATIVE: 1

## 2018-05-21 RX ORDER — OMEPRAZOLE 40 MG/1
CAPSULE, DELAYED RELEASE ORAL
Qty: 90 CAPSULE | Refills: 1 | Status: SHIPPED | OUTPATIENT
Start: 2018-05-21 | End: 2018-11-24 | Stop reason: SDUPTHER

## 2018-06-04 PROBLEM — E44.0 MODERATE PROTEIN-CALORIE MALNUTRITION (HCC): Status: ACTIVE | Noted: 2018-06-04

## 2018-07-11 ENCOUNTER — CARE COORDINATION (OUTPATIENT)
Dept: CARE COORDINATION | Age: 68
End: 2018-07-11

## 2018-07-11 ENCOUNTER — OFFICE VISIT (OUTPATIENT)
Dept: NEUROLOGY | Age: 68
End: 2018-07-11
Payer: MEDICARE

## 2018-07-11 VITALS
SYSTOLIC BLOOD PRESSURE: 107 MMHG | HEIGHT: 60 IN | WEIGHT: 144.5 LBS | DIASTOLIC BLOOD PRESSURE: 71 MMHG | HEART RATE: 54 BPM | BODY MASS INDEX: 28.37 KG/M2

## 2018-07-11 DIAGNOSIS — J38.3 SPASMODIC DYSPHONIA: ICD-10-CM

## 2018-07-11 DIAGNOSIS — G30.8 ALZHEIMER'S DISEASE OF OTHER ONSET WITHOUT BEHAVIORAL DISTURBANCE: Primary | ICD-10-CM

## 2018-07-11 DIAGNOSIS — F02.80 ALZHEIMER'S DISEASE OF OTHER ONSET WITHOUT BEHAVIORAL DISTURBANCE: Primary | ICD-10-CM

## 2018-07-11 PROCEDURE — 99214 OFFICE O/P EST MOD 30 MIN: CPT | Performed by: PSYCHIATRY & NEUROLOGY

## 2018-07-11 ASSESSMENT — ENCOUNTER SYMPTOMS
RESPIRATORY NEGATIVE: 1
EYES NEGATIVE: 1
GASTROINTESTINAL NEGATIVE: 1

## 2018-07-11 NOTE — PROGRESS NOTES
Subjective:      Patient ID: Dee Keita is a 69 y.o. female. HPI    Active problem dementia alzheimer's in type on namenda and aricept with further decline in mentation to be evaluated by home health service . She has spasmodic dysphonia getting botox injections . The condition her  reports that visiting nurse was not helpful not seeing home health service anymore . Her exam is stable needing to be bathed and dressed with her  doing full care having some help from family  . He reports that currently home setup is functioning . She is less verbal having trouble recognizing family members . There are no behavioural issues . She will be flat or laughing at times . She needs help to get dressed and bathed eating on her own . There are no hallucinations . She is tolerating namenda and aricept well. . Last botox injection for spasmodic dysphonia was in October . There are no headaches or focal motor ,sensory or bulbar complaints . Her walking is slow and cautious with no falls not using walking aide. There is no depression . She has hearing loss not wearing hearing aides . There is history of dementia with maternal uncle , and cousin . There is some urinary incontinence . Significant medications aspirin 81 mg po qd, aricept 10 mg po qd , namenda 10 mg po bid  . Testing H31 337 , folic acid > 20, ESR 5, treponemal Ab nonreactive , TSH 5.95 (0-5) . MRI of Head with mild chronic periventricular small vessel ischemia . Moderate cerebral atrophy .  EEG normal . Neuropsychological testing impaired range in every cognitive realm concordant for alzheimer's dementia      Past Medical History:   Diagnosis Date    CAD (coronary artery disease)     Cataracts, bilateral     Colon polyps 08/2017    Dementia     Gastric ulcer 08/2017    GERD (gastroesophageal reflux disease) 12/16/2013    Hiatal hernia     History of colon polyps 08/14/2017    Hyperlipidemia 6/13/2013    Hypertension     Hypothyroidism

## 2018-07-11 NOTE — COMMUNICATION BODY
Subjective:      Patient ID: Nati Hansen is a 69 y.o. female. HPI    Active problem dementia alzheimer's in type on namenda and aricept with further decline in mentation to be evaluated by home health service . She has spasmodic dysphonia getting botox injections . The condition her  reports that visiting nurse was not helpful not seeing home health service anymore . Her exam is stable needing to be bathed and dressed with her  doing full care having some help from family  . He reports that currently home setup is functioning . She is less verbal having trouble recognizing family members . There are no behavioural issues . She will be flat or laughing at times . She needs help to get dressed and bathed eating on her own . There are no hallucinations . She is tolerating namenda and aricept well. . Last botox injection for spasmodic dysphonia was in October . There are no headaches or focal motor ,sensory or bulbar complaints . Her walking is slow and cautious with no falls not using walking aide. There is no depression . She has hearing loss not wearing hearing aides . There is history of dementia with maternal uncle , and cousin . There is some urinary incontinence . Significant medications aspirin 81 mg po qd, aricept 10 mg po qd , namenda 10 mg po bid  . Testing K10 273 , folic acid > 20, ESR 5, treponemal Ab nonreactive , TSH 5.95 (0-5) . MRI of Head with mild chronic periventricular small vessel ischemia . Moderate cerebral atrophy .  EEG normal . Neuropsychological testing impaired range in every cognitive realm concordant for alzheimer's dementia      Past Medical History:   Diagnosis Date    CAD (coronary artery disease)     Cataracts, bilateral     Colon polyps 08/2017    Dementia     Gastric ulcer 08/2017    GERD (gastroesophageal reflux disease) 12/16/2013    Hiatal hernia     History of colon polyps 08/14/2017    Hyperlipidemia 6/13/2013    Hypertension     Hypothyroidism (acquired) 3/25/2017    Laryngitis     MI (myocardial infarction)     Tubular adenoma of colon 10/19/2017    Villous adenoma of colon 08/2017       Past Surgical History:   Procedure Laterality Date    APPENDECTOMY      COLONOSCOPY  2009    COLONOSCOPY  08/14/2017    2.5 cm leion anorectal area-pathology-multiple fragments of villous and villotubular adenomas (most of the polyps removed), right colon--tubular adenoma    CORONARY ANGIOPLASTY WITH STENT PLACEMENT  01/28/1999    JOINT REPLACEMENT Right dec 2014    right shoulder    JOINT REPLACEMENT Left 2012    left knee    OTHER SURGICAL HISTORY      botox injections to vocal cords    OTHER SURGICAL HISTORY Right 03/19/15    I & D rt abd abscess    SIGMOIDOSCOPY  10/09/2017    Minimal probable hypertrophied mucosa at the previous polypectomy site, pathology tubular adenoma    TONSILLECTOMY      UPPER GASTROINTESTINAL ENDOSCOPY  10/09/2017    ulcer is healed, patient has significant esophagitis    UPPER GASTROINTESTINAL ENDOSCOPY  08/14/2017    ULCER AT GE JUNCTION, 2-3 CM LONG HIATAL HERNIA       Family History   Problem Relation Age of Onset    Heart Disease Mother     High Blood Pressure Mother     Heart Disease Father     Other Maternal Uncle         alzeimer's    [facility] 79        [facility]       Social History     Social History    Marital status:      Spouse name: N/A    Number of children: N/A    Years of education: N/A     Social History Main Topics    Smoking status: Never Smoker    Smokeless tobacco: Never Used    Alcohol use No    Drug use: No    Sexual activity: Not Asked     Other Topics Concern    None     Social History Narrative    None       Current Outpatient Prescriptions   Medication Sig Dispense Refill    Nutritional Supplements (ENSURE HIGH PROTEIN) LIQD Take 1 Can by mouth 3 times daily (with meals) 90 Can 3    lisinopril (PRINIVIL;ZESTRIL) 20 MG tablet TAKE 1 TABLET DAILY 90 tablet 0    omeprazole (PRILOSEC) 40 MG delayed release capsule TAKE 1 CAPSULE DAILY 90 capsule 1    donepezil (ARICEPT) 10 MG tablet Take 1 tablet by mouth nightly 90 tablet 3    atenolol (TENORMIN) 50 MG tablet TAKE 1 TABLET DAILY 180 tablet 0    traZODone (DESYREL) 50 MG tablet TAKE 1 TABLET EVERY NIGHT AT BEDTIME 90 tablet 1    levothyroxine (SYNTHROID) 25 MCG tablet TAKE 1 TABLET DAILY 90 tablet 1    amLODIPine (NORVASC) 5 MG tablet TAKE 1 TABLET DAILY 90 tablet 1    memantine (NAMENDA) 10 MG tablet Take 10 mg by mouth 2 times daily      Multiple Vitamins-Minerals (MULTIVITAMIN WOMEN 50+ PO) Take 1 tablet by mouth daily      busPIRone (BUSPAR) 5 MG tablet TAKE 1 TABLET TWICE A  tablet 1    isosorbide mononitrate (IMDUR) 30 MG extended release tablet TAKE 1 TABLET DAILY 90 tablet 1    ZETIA 10 MG tablet 1 tablet daily      NITROSTAT 0.4 MG SL tablet       aspirin 81 MG tablet Take 81 mg by mouth daily.  fenofibrate (TRICOR) 145 MG tablet Take 145 mg by mouth daily. No current facility-administered medications for this visit. Allergies   Allergen Reactions    Cephalexin     Erythromycin     Influenza Virus Vaccine Split     Iodides     Pcn [Penicillins]     Sulfa Antibiotics        Review of Systems   Constitutional: Positive for appetite change and unexpected weight change. HENT: Negative. Eyes: Negative. Respiratory: Negative. Cardiovascular: Negative. Gastrointestinal: Negative. Endocrine: Negative. Genitourinary: Negative. Musculoskeletal: Negative. Neurological: Positive for speech difficulty. Hematological: Negative. Psychiatric/Behavioral: Positive for confusion. Objective:   Physical Exam    Vitals:    07/11/18 1500   BP: 107/71   Pulse: 54     weight: 144 lb 8 oz (65.5 kg)    Neurological Examination  Constitutional .General exam well groomed   Ears /Nose/Throat: external ear . Normal exam  Neck and thyroid . Normal size  Respiratory

## 2018-07-11 NOTE — LETTER
ischemia . Moderate cerebral atrophy .  EEG normal . Neuropsychological testing impaired range in every cognitive realm concordant for alzheimer's dementia      Past Medical History:   Diagnosis Date    CAD (coronary artery disease)     Cataracts, bilateral     Colon polyps 08/2017    Dementia     Gastric ulcer 08/2017    GERD (gastroesophageal reflux disease) 12/16/2013    Hiatal hernia     History of colon polyps 08/14/2017    Hyperlipidemia 6/13/2013    Hypertension     Hypothyroidism (acquired) 3/25/2017    Laryngitis     MI (myocardial infarction)     Tubular adenoma of colon 10/19/2017    Villous adenoma of colon 08/2017       Past Surgical History:   Procedure Laterality Date    APPENDECTOMY      COLONOSCOPY  2009    COLONOSCOPY  08/14/2017    2.5 cm leion anorectal area-pathology-multiple fragments of villous and villotubular adenomas (most of the polyps removed), right colon--tubular adenoma    CORONARY ANGIOPLASTY WITH STENT PLACEMENT  01/28/1999    JOINT REPLACEMENT Right dec 2014    right shoulder    JOINT REPLACEMENT Left 2012    left knee    OTHER SURGICAL HISTORY      botox injections to vocal cords    OTHER SURGICAL HISTORY Right 03/19/15    I & D rt abd abscess    SIGMOIDOSCOPY  10/09/2017    Minimal probable hypertrophied mucosa at the previous polypectomy site, pathology tubular adenoma    TONSILLECTOMY      UPPER GASTROINTESTINAL ENDOSCOPY  10/09/2017    ulcer is healed, patient has significant esophagitis    UPPER GASTROINTESTINAL ENDOSCOPY  08/14/2017    ULCER AT GE JUNCTION, 2-3 CM LONG HIATAL HERNIA       Family History   Problem Relation Age of Onset    Heart Disease Mother     High Blood Pressure Mother     Heart Disease Father     Other Maternal Uncle         alzeimer's    Other Maternal Cousin 79        alzeimers       Social History     Social History    Marital status:      Spouse name: N/A    Number of children: N/A    Years of education: N/A Gastrointestinal: Negative. Endocrine: Negative. Genitourinary: Negative. Musculoskeletal: Negative. Neurological: Positive for speech difficulty. Hematological: Negative. Psychiatric/Behavioral: Positive for confusion. Objective:   Physical Exam    Vitals:    07/11/18 1500   BP: 107/71   Pulse: 54     weight: 144 lb 8 oz (65.5 kg)    Neurological Examination  Constitutional .General exam well groomed   Ears /Nose/Throat: external ear . Normal exam  Neck and thyroid . Normal size  Respiratory . Breathsounds clear bilaterally  Cardiovascular: Auscultation of heart with regular rate and rhythm and normal heart sounds  Musculoskeletal. Muscle tone mild paratonia                                                        Muscle strength 5/5 strength throughout                                                                                 No dysmetria or dysdiadokinesis  No tremor   Normal fine motor  Gait minor apraxia with right hand tremor wither hand hanging at her side   Orientation Alert and oriented x 0 .  nonverbal.  Not serial commands  Attention and concentration reduced  Short term memory 0 words out of 3 in one minute   Language process nonverbal.  Not following commands   Cranial nerve 2 normal acuety and visual fields  Cranial nerve 3, 4 and 6 . Extraocular muscles are intact . Pupils are equal and reactive   Cranial nerve 5 . Normal strength of masseter and temporalis . Intact corneal reflex. Normal facial sensation  Cranial nerve 7 normal exam   Cranial nerve 8. Grossly intact hearing   Cranial nerve 9 and 10. Symmetric palate elevation   Cranial nerve 11 , 5 out of 5 strength   Cranial Nerve 12 midline tongue . No atrophy  Sensation . Normal pinprick and light touch   Deep Tendon Reflexes normal  Plantar response flexor bilaterally    Assessment:      1. Alzheimer's disease of other onset without behavioral disturbance    2.  Spasmodic dysphonia

## 2018-07-19 ENCOUNTER — CARE COORDINATION (OUTPATIENT)
Dept: CARE COORDINATION | Age: 68
End: 2018-07-19

## 2018-07-19 NOTE — CARE COORDINATION
past year?:  No     Frequent urination at night?:  No  Do you use rails/bars?:  Yes  Do you have a non-slip tub mat?:  Yes     Are you experiencing loss of meaning?:  (Comment: patient is nonverbal)  Are you experiencing loss of hope and peace?:  (Comment: patient is nonverbal)     Thinking about your patient's physical health needs, are there any symptoms or problems (risk indicators) you are unsure about that require further investigation?:  No identified areas of uncertainly or problems already being investigated   Are the patients physical health problems impacting on their mental well-being?:  No identified areas of concern   Are there any problems with your patients lifestyle behaviors (alcohol, drugs, diet, exercise) that are impacting on physical or mental well-being?:  No identified areas of concern   Do you have any other concerns about your patients mental well-being?  How would you rate their severity and impact on the patient?:  No identified areas of concern   How would you rate their home environment in terms of safety and stability (including domestic violence, insecure housing, neighbor harassment)?:  Consistently safe, supportive, stable, no identified problems   How do daily activities impact on the patient's well-being? (include current or anticipated unemployment, work, caregiving, access to transportation or other):  No identified problems or perceived positive benefits   How would you rate their social network (family, work, friends)?:  Adequate participation with social networks   How would you rate their financial resources (including ability to afford all required medical care)?:  Financially secure, some resource challenges   How wells does the patient now understand their health and well-being (symptoms, signs or risk factors) and what they need to do to manage their health?:  Reasonable to good understanding and already engages in managing health or is willing to undertake better Jonnie Bolden, APRN - CNP   isosorbide mononitrate (IMDUR) 30 MG extended release tablet TAKE 1 TABLET DAILY 11/30/16  Yes Yemi Singh MD   ZETIA 10 MG tablet 1 tablet daily 5/6/16  Yes Historical Provider, MD   NITROSTAT 0.4 MG SL tablet  10/7/15  Yes Historical Provider, MD   aspirin 81 MG tablet Take 81 mg by mouth daily. Yes Historical Provider, MD   fenofibrate (TRICOR) 145 MG tablet Take 145 mg by mouth daily.    Yes Historical Provider, MD       Future Appointments  Date Time Provider Jess Ramirez   7/30/2018 2:00 PM Reece Davis MD [id number]   12/6/2018 1:00 PM Yemi Singh MD [place] [id number] [facility]   1/17/2019 2:40 PM To Ryan MD Neuro Spec [facility]

## 2018-07-23 ENCOUNTER — CARE COORDINATION (OUTPATIENT)
Dept: CARE COORDINATION | Age: 68
End: 2018-07-23

## 2018-07-23 NOTE — CARE COORDINATION
Gonsalo Dawson called . Cttim Cameronde reports that he was busy caring for Janet Barreto when provider called. Per Gonsalo Dawson, he has been the primary caretaker for his wife for about 3 years. Franciapetr Dawson reports that he used to be able to take little breaks however Janet Barreto has begun to wander when left alone. Franciapetr Dawson reports that he is interested in respite care however would prefer someone to come to the house rather than sending Janet Barreto to a facility.  inquired if Franciapetr Dawson would be interested in an adult day care program.  Gonsalo Dawson reports he would be interested in day programs or activities for Janet Barreto.  inquired if Franciapetr Dawson would like to meet at their doctors office, a coffee shop close to his house, social workers office, or mail the information where Gonsalo Dawson can review it and call with questions. Gonsalo Dawson reports mail would probably be the easiest.   stated she would print off information and mail packet of information out today with business card attached with contact information.       Plan:    will mail Gonsalo Dawson information on the [employer]

## 2018-07-23 NOTE — CARE COORDINATION
Emily Case is a 68-year old female who is a patient of Dr. Wagner Stapleton. Alva Foy, sent  a referral for Yves Finch to assist with respite care.  attempted to reach Fabrice Carrillo, [address]  however no answer.  left message introducing herself and requested a call back to [phone]    Plan:    will call back tomorrow if she does not hear back from Fabrice Carrillo today.

## 2018-07-30 ENCOUNTER — CARE COORDINATION (OUTPATIENT)
Dept: CARE COORDINATION | Age: 68
End: 2018-07-30

## 2018-07-31 ENCOUNTER — CARE COORDINATION (OUTPATIENT)
Dept: CARE COORDINATION | Age: 68
End: 2018-07-31

## 2018-07-31 NOTE — CARE COORDINATION
mailed Mr. Sydney Mcgrath a list of [facility] and a list of questions to ask that were recomended by the [facility] when trying to find a aid/RN to assist with your loved one.

## 2018-08-27 ENCOUNTER — CARE COORDINATION (OUTPATIENT)
Dept: CARE COORDINATION | Age: 68
End: 2018-08-27

## 2018-08-27 NOTE — CARE COORDINATION
Attempted to reach Ruth's spouse Columba Maradiaga for CC follow up. Left  requesting a return call to [phone]. Columba Maradiaga returned PC and stated Nasima Johns is home and doing better. She is now talking and able to eat regular food. Columb[facility] were [facility] for seven hours without anyone seeing her. He has her home and is her CG with help from friends when he needs to go somewhere. Columba Maradiaga states he has all the resources he needs in case he wants to get respite care and/or some help in the home. He states so far he needs no further assistance. Message routed to PCP for permission to graduate Nasima Johns from Care Coordination. Columba Maradiaga has [address] and SW contact information should he need any further assistance. CC Plan: graduate from [facility] if ok with PCP.

## 2018-11-28 RX ORDER — OMEPRAZOLE 40 MG/1
CAPSULE, DELAYED RELEASE ORAL
Qty: 90 CAPSULE | Refills: 1 | Status: SHIPPED | OUTPATIENT
Start: 2018-11-28 | End: 2019-05-24 | Stop reason: SDUPTHER

## 2018-12-10 ENCOUNTER — HOSPITAL ENCOUNTER (OUTPATIENT)
Age: 68
Setting detail: SPECIMEN
Discharge: HOME | End: 2018-12-10
Payer: MEDICARE

## 2018-12-10 DIAGNOSIS — I10 ESSENTIAL HYPERTENSION: ICD-10-CM

## 2018-12-10 DIAGNOSIS — E03.9 HYPOTHYROIDISM (ACQUIRED): ICD-10-CM

## 2018-12-10 DIAGNOSIS — E78.5 HYPERLIPIDEMIA, UNSPECIFIED HYPERLIPIDEMIA TYPE: ICD-10-CM

## 2018-12-10 LAB
ALBUMIN SERPL-MCNC: 4.4 G/DL (ref 3.5–5.2)
ALBUMIN/GLOBULIN RATIO: 1.6 (ref 1–2.5)
ALP BLD-CCNC: 65 U/L (ref 35–104)
ALT SERPL-CCNC: 25 U/L (ref 5–33)
ANION GAP SERPL CALCULATED.3IONS-SCNC: 13 MMOL/L (ref 9–17)
AST SERPL-CCNC: 25 U/L
BILIRUB SERPL-MCNC: 0.4 MG/DL (ref 0.3–1.2)
BILIRUBIN DIRECT: 0.12 MG/DL
BILIRUBIN, INDIRECT: 0.28 MG/DL (ref 0–1)
BUN BLDV-MCNC: 11 MG/DL (ref 8–23)
CALCIUM SERPL-MCNC: 9.6 MG/DL (ref 8.6–10.4)
CHLORIDE BLD-SCNC: 102 MMOL/L (ref 98–107)
CHOLESTEROL/HDL RATIO: 4.5
CHOLESTEROL: 241 MG/DL
CO2: 28 MMOL/L (ref 20–31)
CREAT SERPL-MCNC: 0.7 MG/DL (ref 0.5–0.9)
GFR AFRICAN AMERICAN: >60 ML/MIN
GFR NON-AFRICAN AMERICAN: >60 ML/MIN
GFR SERPL CREATININE-BSD FRML MDRD: NORMAL ML/MIN/{1.73_M2}
GFR SERPL CREATININE-BSD FRML MDRD: NORMAL ML/MIN/{1.73_M2}
GLUCOSE BLD-MCNC: 89 MG/DL (ref 70–99)
HCT VFR BLD CALC: 46.3 % (ref 36.3–47.1)
HDLC SERPL-MCNC: 53 MG/DL
HEMOGLOBIN: 14.6 G/DL (ref 11.9–15.1)
LDL CHOLESTEROL: 152 MG/DL (ref 0–130)
MCH RBC QN AUTO: 29.8 PG (ref 25.2–33.5)
MCHC RBC AUTO-ENTMCNC: 31.5 G/DL (ref 28.4–34.8)
MCV RBC AUTO: 94.5 FL (ref 82.6–102.9)
NRBC AUTOMATED: 0 PER 100 WBC
PDW BLD-RTO: 13.8 % (ref 11.8–14.4)
PLATELET # BLD: 253 K/UL (ref 138–453)
PMV BLD AUTO: 9.9 FL (ref 8.1–13.5)
POTASSIUM SERPL-SCNC: 4.8 MMOL/L (ref 3.7–5.3)
RBC # BLD: 4.9 M/UL (ref 3.95–5.11)
SODIUM BLD-SCNC: 143 MMOL/L (ref 135–144)
THYROXINE, FREE: 0.93 NG/DL (ref 0.93–1.7)
TOTAL PROTEIN: 7.1 G/DL (ref 6.4–8.3)
TRIGL SERPL-MCNC: 179 MG/DL
TSH SERPL DL<=0.05 MIU/L-ACNC: 3.75 MIU/L (ref 0.3–5)
VLDLC SERPL CALC-MCNC: ABNORMAL MG/DL (ref 1–30)
WBC # BLD: 5.3 K/UL (ref 3.5–11.3)

## 2019-01-17 ENCOUNTER — OFFICE VISIT (OUTPATIENT)
Dept: NEUROLOGY | Age: 69
End: 2019-01-17
Payer: MEDICARE

## 2019-01-17 VITALS
HEART RATE: 88 BPM | WEIGHT: 123 LBS | DIASTOLIC BLOOD PRESSURE: 78 MMHG | HEIGHT: 60 IN | SYSTOLIC BLOOD PRESSURE: 135 MMHG | BODY MASS INDEX: 24.15 KG/M2

## 2019-01-17 DIAGNOSIS — J38.3 SPASMODIC DYSPHONIA: ICD-10-CM

## 2019-01-17 DIAGNOSIS — F02.80 LATE ONSET ALZHEIMER'S DISEASE WITHOUT BEHAVIORAL DISTURBANCE (HCC): ICD-10-CM

## 2019-01-17 DIAGNOSIS — G30.1 LATE ONSET ALZHEIMER'S DISEASE WITHOUT BEHAVIORAL DISTURBANCE (HCC): ICD-10-CM

## 2019-01-17 DIAGNOSIS — F02.80 ALZHEIMER'S DISEASE OF OTHER ONSET WITHOUT BEHAVIORAL DISTURBANCE: Primary | ICD-10-CM

## 2019-01-17 DIAGNOSIS — G30.8 ALZHEIMER'S DISEASE OF OTHER ONSET WITHOUT BEHAVIORAL DISTURBANCE: Primary | ICD-10-CM

## 2019-01-17 PROCEDURE — G8484 FLU IMMUNIZE NO ADMIN: HCPCS | Performed by: PSYCHIATRY & NEUROLOGY

## 2019-01-17 PROCEDURE — 99214 OFFICE O/P EST MOD 30 MIN: CPT | Performed by: PSYCHIATRY & NEUROLOGY

## 2019-01-17 PROCEDURE — 1123F ACP DISCUSS/DSCN MKR DOCD: CPT | Performed by: PSYCHIATRY & NEUROLOGY

## 2019-01-17 PROCEDURE — G8427 DOCREV CUR MEDS BY ELIG CLIN: HCPCS | Performed by: PSYCHIATRY & NEUROLOGY

## 2019-01-17 PROCEDURE — 3017F COLORECTAL CA SCREEN DOC REV: CPT | Performed by: PSYCHIATRY & NEUROLOGY

## 2019-01-17 PROCEDURE — G8400 PT W/DXA NO RESULTS DOC: HCPCS | Performed by: PSYCHIATRY & NEUROLOGY

## 2019-01-17 PROCEDURE — 4040F PNEUMOC VAC/ADMIN/RCVD: CPT | Performed by: PSYCHIATRY & NEUROLOGY

## 2019-01-17 PROCEDURE — 1101F PT FALLS ASSESS-DOCD LE1/YR: CPT | Performed by: PSYCHIATRY & NEUROLOGY

## 2019-01-17 PROCEDURE — 1036F TOBACCO NON-USER: CPT | Performed by: PSYCHIATRY & NEUROLOGY

## 2019-01-17 PROCEDURE — 1090F PRES/ABSN URINE INCON ASSESS: CPT | Performed by: PSYCHIATRY & NEUROLOGY

## 2019-01-17 PROCEDURE — G8420 CALC BMI NORM PARAMETERS: HCPCS | Performed by: PSYCHIATRY & NEUROLOGY

## 2019-01-17 ASSESSMENT — ENCOUNTER SYMPTOMS
CONSTIPATION: 1
EYES NEGATIVE: 1
RESPIRATORY NEGATIVE: 1

## 2019-05-03 ENCOUNTER — APPOINTMENT (OUTPATIENT)
Dept: CT IMAGING | Age: 69
End: 2019-05-03
Payer: MEDICARE

## 2019-05-03 ENCOUNTER — HOSPITAL ENCOUNTER (EMERGENCY)
Age: 69
Discharge: ANOTHER HOSPITAL | End: 2019-05-03
Payer: MEDICARE

## 2019-05-03 ENCOUNTER — APPOINTMENT (OUTPATIENT)
Dept: GENERAL RADIOLOGY | Age: 69
End: 2019-05-03
Payer: MEDICARE

## 2019-05-03 ENCOUNTER — HOSPITAL ENCOUNTER (OUTPATIENT)
Age: 69
Setting detail: OBSERVATION
Discharge: HOME | End: 2019-05-06
Attending: EMERGENCY MEDICINE | Admitting: FAMILY MEDICINE
Payer: MEDICARE

## 2019-05-03 DIAGNOSIS — G30.1 LATE ONSET ALZHEIMER'S DISEASE WITHOUT BEHAVIORAL DISTURBANCE (HCC): ICD-10-CM

## 2019-05-03 DIAGNOSIS — F02.80 LATE ONSET ALZHEIMER'S DISEASE WITHOUT BEHAVIORAL DISTURBANCE (HCC): ICD-10-CM

## 2019-05-03 DIAGNOSIS — E43 SEVERE MALNUTRITION (HCC): ICD-10-CM

## 2019-05-03 DIAGNOSIS — G45.9 TIA (TRANSIENT ISCHEMIC ATTACK): ICD-10-CM

## 2019-05-03 DIAGNOSIS — R55 SYNCOPE AND COLLAPSE: Primary | ICD-10-CM

## 2019-05-03 LAB
ABSOLUTE EOS #: 0.1 K/UL (ref 0–0.4)
ABSOLUTE IMMATURE GRANULOCYTE: ABNORMAL K/UL (ref 0–0.3)
ABSOLUTE LYMPH #: 1 K/UL (ref 1–4.8)
ABSOLUTE MONO #: 0.5 K/UL (ref 0.1–1.3)
ALBUMIN SERPL-MCNC: 4.5 G/DL (ref 3.5–5.2)
ALBUMIN/GLOBULIN RATIO: ABNORMAL (ref 1–2.5)
ALP BLD-CCNC: 63 U/L (ref 35–104)
ALT SERPL-CCNC: 29 U/L (ref 5–33)
ANION GAP SERPL CALCULATED.3IONS-SCNC: 13 MMOL/L (ref 9–17)
AST SERPL-CCNC: 29 U/L
BASOPHILS # BLD: 1 % (ref 0–2)
BASOPHILS ABSOLUTE: 0 K/UL (ref 0–0.2)
BILIRUB SERPL-MCNC: 0.36 MG/DL (ref 0.3–1.2)
BUN BLDV-MCNC: 15 MG/DL (ref 8–23)
BUN/CREAT BLD: ABNORMAL (ref 9–20)
CALCIUM SERPL-MCNC: 9.1 MG/DL (ref 8.6–10.4)
CHLORIDE BLD-SCNC: 97 MMOL/L (ref 98–107)
CO2: 29 MMOL/L (ref 20–31)
CREAT SERPL-MCNC: 0.68 MG/DL (ref 0.5–0.9)
DIFFERENTIAL TYPE: ABNORMAL
EOSINOPHILS RELATIVE PERCENT: 1 % (ref 0–4)
ETHANOL PERCENT: <0.01 %
ETHANOL: <10 MG/DL
GFR AFRICAN AMERICAN: >60 ML/MIN
GFR NON-AFRICAN AMERICAN: 51 ML/MIN
GFR NON-AFRICAN AMERICAN: >60 ML/MIN
GFR SERPL CREATININE-BSD FRML MDRD: >60 ML/MIN
GFR SERPL CREATININE-BSD FRML MDRD: ABNORMAL ML/MIN/{1.73_M2}
GLUCOSE BLD-MCNC: 117 MG/DL (ref 70–99)
HCT VFR BLD CALC: 41.1 % (ref 36–46)
HEMOGLOBIN: 14.1 G/DL (ref 12–16)
IMMATURE GRANULOCYTES: ABNORMAL %
INR BLD: 1.1
LYMPHOCYTES # BLD: 15 % (ref 24–44)
MAGNESIUM: 2.1 MG/DL (ref 1.6–2.6)
MCH RBC QN AUTO: 31.6 PG (ref 26–34)
MCHC RBC AUTO-ENTMCNC: 34.4 G/DL (ref 31–37)
MCV RBC AUTO: 92 FL (ref 80–100)
MONOCYTES # BLD: 7 % (ref 1–7)
NRBC AUTOMATED: ABNORMAL PER 100 WBC
PDW BLD-RTO: 14 % (ref 11.5–14.9)
PLATELET # BLD: 237 K/UL (ref 150–450)
PLATELET ESTIMATE: ABNORMAL
PMV BLD AUTO: 7.1 FL (ref 6–12)
POC CREATININE: 1.07 MG/DL (ref 0.51–1.19)
POTASSIUM SERPL-SCNC: 4.2 MMOL/L (ref 3.7–5.3)
PROTHROMBIN TIME: 13.8 SEC (ref 11.8–14.6)
RBC # BLD: 4.47 M/UL (ref 4–5.2)
RBC # BLD: ABNORMAL 10*6/UL
SEG NEUTROPHILS: 76 % (ref 36–66)
SEGMENTED NEUTROPHILS ABSOLUTE COUNT: 5 K/UL (ref 1.3–9.1)
SODIUM BLD-SCNC: 139 MMOL/L (ref 135–144)
TOTAL PROTEIN: 7.3 G/DL (ref 6.4–8.3)
TROPONIN INTERP: ABNORMAL
TROPONIN INTERP: ABNORMAL
TROPONIN T: ABNORMAL NG/ML
TROPONIN T: ABNORMAL NG/ML
TROPONIN, HIGH SENSITIVITY: 20 NG/L (ref 0–14)
TROPONIN, HIGH SENSITIVITY: 27 NG/L (ref 0–14)
WBC # BLD: 6.5 K/UL (ref 3.5–11)
WBC # BLD: ABNORMAL 10*3/UL

## 2019-05-03 PROCEDURE — 70498 CT ANGIOGRAPHY NECK: CPT

## 2019-05-03 PROCEDURE — 80053 COMPREHEN METABOLIC PANEL: CPT

## 2019-05-03 PROCEDURE — 96375 TX/PRO/DX INJ NEW DRUG ADDON: CPT

## 2019-05-03 PROCEDURE — G0378 HOSPITAL OBSERVATION PER HR: HCPCS

## 2019-05-03 PROCEDURE — 6360000004 HC RX CONTRAST MEDICATION: Performed by: EMERGENCY MEDICINE

## 2019-05-03 PROCEDURE — 83735 ASSAY OF MAGNESIUM: CPT

## 2019-05-03 PROCEDURE — 70496 CT ANGIOGRAPHY HEAD: CPT

## 2019-05-03 PROCEDURE — 99285 EMERGENCY DEPT VISIT HI MDM: CPT

## 2019-05-03 PROCEDURE — 99214 OFFICE O/P EST MOD 30 MIN: CPT | Performed by: PSYCHIATRY & NEUROLOGY

## 2019-05-03 PROCEDURE — 36415 COLL VENOUS BLD VENIPUNCTURE: CPT

## 2019-05-03 PROCEDURE — G0480 DRUG TEST DEF 1-7 CLASSES: HCPCS

## 2019-05-03 PROCEDURE — 84484 ASSAY OF TROPONIN QUANT: CPT

## 2019-05-03 PROCEDURE — 81003 URINALYSIS AUTO W/O SCOPE: CPT

## 2019-05-03 PROCEDURE — 85025 COMPLETE CBC W/AUTO DIFF WBC: CPT

## 2019-05-03 PROCEDURE — 93005 ELECTROCARDIOGRAM TRACING: CPT

## 2019-05-03 PROCEDURE — 2580000003 HC RX 258: Performed by: EMERGENCY MEDICINE

## 2019-05-03 PROCEDURE — 82947 ASSAY GLUCOSE BLOOD QUANT: CPT

## 2019-05-03 PROCEDURE — 70450 CT HEAD/BRAIN W/O DYE: CPT

## 2019-05-03 PROCEDURE — 6360000002 HC RX W HCPCS: Performed by: EMERGENCY MEDICINE

## 2019-05-03 PROCEDURE — 85610 PROTHROMBIN TIME: CPT

## 2019-05-03 PROCEDURE — 71045 X-RAY EXAM CHEST 1 VIEW: CPT

## 2019-05-03 PROCEDURE — 6370000000 HC RX 637 (ALT 250 FOR IP): Performed by: EMERGENCY MEDICINE

## 2019-05-03 PROCEDURE — 82565 ASSAY OF CREATININE: CPT

## 2019-05-03 PROCEDURE — 96374 THER/PROPH/DIAG INJ IV PUSH: CPT

## 2019-05-03 RX ORDER — LISINOPRIL 20 MG/1
20 TABLET ORAL DAILY
Status: DISCONTINUED | OUTPATIENT
Start: 2019-05-04 | End: 2019-05-04

## 2019-05-03 RX ORDER — AMLODIPINE BESYLATE 5 MG/1
5 TABLET ORAL DAILY
Status: DISCONTINUED | OUTPATIENT
Start: 2019-05-04 | End: 2019-05-04

## 2019-05-03 RX ORDER — SODIUM CHLORIDE 0.9 % (FLUSH) 0.9 %
10 SYRINGE (ML) INJECTION PRN
Status: DISCONTINUED | OUTPATIENT
Start: 2019-05-03 | End: 2019-05-06 | Stop reason: HOSPADM

## 2019-05-03 RX ORDER — LEVOTHYROXINE SODIUM 0.03 MG/1
25 TABLET ORAL DAILY
Status: DISCONTINUED | OUTPATIENT
Start: 2019-05-04 | End: 2019-05-04

## 2019-05-03 RX ORDER — SODIUM CHLORIDE 0.9 % (FLUSH) 0.9 %
10 SYRINGE (ML) INJECTION PRN
Status: DISCONTINUED | OUTPATIENT
Start: 2019-05-03 | End: 2019-05-04 | Stop reason: SDUPTHER

## 2019-05-03 RX ORDER — ASPIRIN 325 MG
325 TABLET ORAL ONCE
Status: COMPLETED | OUTPATIENT
Start: 2019-05-03 | End: 2019-05-03

## 2019-05-03 RX ORDER — METHYLPREDNISOLONE SODIUM SUCCINATE 125 MG/2ML
125 INJECTION, POWDER, LYOPHILIZED, FOR SOLUTION INTRAMUSCULAR; INTRAVENOUS ONCE
Status: COMPLETED | OUTPATIENT
Start: 2019-05-03 | End: 2019-05-03

## 2019-05-03 RX ORDER — ASPIRIN 81 MG/1
162 TABLET ORAL DAILY
Status: DISCONTINUED | OUTPATIENT
Start: 2019-05-04 | End: 2019-05-06 | Stop reason: HOSPADM

## 2019-05-03 RX ORDER — ACETAMINOPHEN 325 MG/1
650 TABLET ORAL EVERY 4 HOURS PRN
Status: DISCONTINUED | OUTPATIENT
Start: 2019-05-03 | End: 2019-05-06 | Stop reason: HOSPADM

## 2019-05-03 RX ORDER — PANTOPRAZOLE SODIUM 40 MG/1
40 TABLET, DELAYED RELEASE ORAL
Status: DISCONTINUED | OUTPATIENT
Start: 2019-05-04 | End: 2019-05-04

## 2019-05-03 RX ORDER — M-VIT,TX,IRON,MINS/CALC/FOLIC 27MG-0.4MG
1 TABLET ORAL DAILY
Status: DISCONTINUED | OUTPATIENT
Start: 2019-05-04 | End: 2019-05-06 | Stop reason: HOSPADM

## 2019-05-03 RX ORDER — FEEDER CONTAINER WITH PUMP SET
1 EACH MISCELLANEOUS
Status: DISCONTINUED | OUTPATIENT
Start: 2019-05-04 | End: 2019-05-03 | Stop reason: CLARIF

## 2019-05-03 RX ORDER — METHYLPREDNISOLONE SODIUM SUCCINATE 125 MG/2ML
INJECTION, POWDER, LYOPHILIZED, FOR SOLUTION INTRAMUSCULAR; INTRAVENOUS
Status: DISPENSED
Start: 2019-05-03 | End: 2019-05-04

## 2019-05-03 RX ORDER — TRAZODONE HYDROCHLORIDE 50 MG/1
50 TABLET ORAL NIGHTLY
Status: DISCONTINUED | OUTPATIENT
Start: 2019-05-03 | End: 2019-05-04

## 2019-05-03 RX ORDER — 0.9 % SODIUM CHLORIDE 0.9 %
80 INTRAVENOUS SOLUTION INTRAVENOUS ONCE
Status: COMPLETED | OUTPATIENT
Start: 2019-05-03 | End: 2019-05-03

## 2019-05-03 RX ORDER — ISOSORBIDE MONONITRATE 30 MG/1
30 TABLET, EXTENDED RELEASE ORAL DAILY
Status: DISCONTINUED | OUTPATIENT
Start: 2019-05-04 | End: 2019-05-04

## 2019-05-03 RX ORDER — SODIUM CHLORIDE 0.9 % (FLUSH) 0.9 %
10 SYRINGE (ML) INJECTION EVERY 12 HOURS SCHEDULED
Status: DISCONTINUED | OUTPATIENT
Start: 2019-05-03 | End: 2019-05-06 | Stop reason: HOSPADM

## 2019-05-03 RX ORDER — DIPHENHYDRAMINE HYDROCHLORIDE 50 MG/ML
25 INJECTION INTRAMUSCULAR; INTRAVENOUS ONCE
Status: COMPLETED | OUTPATIENT
Start: 2019-05-03 | End: 2019-05-03

## 2019-05-03 RX ORDER — NITROGLYCERIN 0.4 MG/1
0.4 TABLET SUBLINGUAL EVERY 5 MIN PRN
Status: DISCONTINUED | OUTPATIENT
Start: 2019-05-03 | End: 2019-05-06 | Stop reason: HOSPADM

## 2019-05-03 RX ORDER — DIPHENHYDRAMINE HYDROCHLORIDE 50 MG/ML
INJECTION INTRAMUSCULAR; INTRAVENOUS
Status: DISPENSED
Start: 2019-05-03 | End: 2019-05-04

## 2019-05-03 RX ORDER — MEMANTINE HYDROCHLORIDE 10 MG/1
10 TABLET ORAL 2 TIMES DAILY
Status: DISCONTINUED | OUTPATIENT
Start: 2019-05-03 | End: 2019-05-04

## 2019-05-03 RX ADMIN — ASPIRIN 325 MG ORAL TABLET 325 MG: 325 PILL ORAL at 20:02

## 2019-05-03 RX ADMIN — Medication 10 ML: at 17:25

## 2019-05-03 RX ADMIN — DIPHENHYDRAMINE HYDROCHLORIDE 25 MG: 50 INJECTION, SOLUTION INTRAMUSCULAR; INTRAVENOUS at 16:45

## 2019-05-03 RX ADMIN — METHYLPREDNISOLONE SODIUM SUCCINATE 125 MG: 125 INJECTION, POWDER, FOR SOLUTION INTRAMUSCULAR; INTRAVENOUS at 16:44

## 2019-05-03 RX ADMIN — SODIUM CHLORIDE 80 ML: 9 INJECTION, SOLUTION INTRAVENOUS at 17:25

## 2019-05-03 RX ADMIN — IOVERSOL 75 ML: 741 INJECTION INTRA-ARTERIAL; INTRAVENOUS at 17:25

## 2019-05-03 NOTE — ED PROVIDER NOTES
EMERGENCY DEPARTMENT ENCOUNTER    Pt Name: Tiffanie Neely  MRN: [id number]  [place] 1950  Date of evaluation: 5/3/19  CHIEF COMPLAINT       Chief Complaint   Patient presents with    Cerebrovascular Accident     stroke alert   syncope, possible stroke  HISTORY OF PRESENT ILLNESS   The history is provided by the EMS personnel. The history is limited by the absence of a caregiver and the condition of the patient (aphasia at baseline). Loss of Consciousness   Episode history:  Single  Most recent episode: Today  Duration: 6-8 minutes per daughter. Timing:  Constant  Progression:  Resolved  Chronicity:  New  Witnessed: yes (by daughter)    Worsened by:  Nothing  Ineffective treatments: called EMS. Had witnessed syncopal episode today at 3 pm    Arrived via [facility], ct brain in field showed possible left MCA vs artifact, Dr. Delores Valentine rec go to [facility] for cta head and neck    DW daughter Obi Guevara, she states they were feeding her puree food at 3 pm, Kadeem Benavides pushed it away, then had syncope event and right side seemed to be flacid. She briefly did some chest compressions, she called EMS for stroke alert. She is much improved now, aphasic at baseline. Daughter states she has severe advanced dementia, lives at home with family. REVIEW OF SYSTEMS     Review of Systems   Unable to perform ROS: Dementia (aphasia at baseline)   Cardiovascular: Positive for syncope.      PASTMEDICAL HISTORY     Past Medical History:   Diagnosis Date    CAD (coronary artery disease)     Cataracts, bilateral     Colon polyps 08/2017    Dementia     Gastric ulcer 08/2017    GERD (gastroesophageal reflux disease) 12/16/2013    Hiatal hernia     History of colon polyps 08/14/2017    Hyperlipidemia 6/13/2013    Hypertension     Hypothyroidism (acquired) 3/25/2017    Laryngitis     MI ([facility]ocardial infarction) [facility] [address]     Tubular adenoma of colon 10/19/2017    Villous adenoma of colon 08/2017     SURGICAL HISTORY Past Surgical History:   Procedure Laterality Date    APPENDECTOMY      COLONOSCOPY  2009    COLONOSCOPY  08/14/2017    2.5 cm leion anorectal area-pathology-multiple fragments of villous and villotubular adenomas (most of the polyps removed), right colon--tubular adenoma    CORONARY ANGIOPLASTY WITH STENT PLACEMENT  01/28/1999    JOINT REPLACEMENT Right dec 2014    right shoulder    JOINT REPLACEMENT Left 2012    left knee    OTHER SURGICAL HISTORY      botox injections to vocal cords    OTHER SURGICAL HISTORY Right 03/19/15    I & D rt abd abscess    SIGMOIDOSCOPY  10/09/2017    Minimal probable hypertrophied mucosa at the previous polypectomy site, pathology tubular adenoma    TONSILLECTOMY      UPPER GASTROINTESTINAL ENDOSCOPY  10/09/2017    ulcer is healed, patient has significant esophagitis    UPPER GASTROINTESTINAL ENDOSCOPY  08/14/2017    ULCER AT GE JUNCTION, 2-3 CM LONG HIATAL HERNIA     CURRENT MEDICATIONS       Previous Medications    AMLODIPINE (NORVASC) 5 MG TABLET    TAKE 1 TABLET DAILY    ASPIRIN 81 MG TABLET    Take 162 mg by mouth daily     BUSPIRONE (BUSPAR) 5 MG TABLET    TAKE 1 TABLET TWICE A DAY    ISOSORBIDE MONONITRATE (IMDUR) 30 MG EXTENDED RELEASE TABLET    TAKE 1 TABLET DAILY    LEVOTHYROXINE (SYNTHROID) 25 MCG TABLET    TAKE 1 TABLET DAILY    LISINOPRIL (PRINIVIL;ZESTRIL) 20 MG TABLET    TAKE 1 TABLET DAILY    MEMANTINE (NAMENDA) 10 MG TABLET    Take 10 mg by mouth 2 times daily    MULTIPLE VITAMINS-MINERALS (MULTIVITAMIN WOMEN 50+ PO)    Take 1 tablet by mouth daily    NITROSTAT 0.4 MG SL TABLET        NUTRITIONAL SUPPLEMENTS (ENSURE HIGH PROTEIN) LIQD    Take 1 Can by mouth 3 times daily (with meals)    OMEPRAZOLE (PRILOSEC) 40 MG DELAYED RELEASE CAPSULE    TAKE 1 CAPSULE DAILY    TRAZODONE (DESYREL) 50 MG TABLET    TAKE 1 TABLET EVERY NIGHT AT BEDTIME    ZETIA 10 MG TABLET    1 tablet daily     ALLERGIES     is allergic to cephalexin; erythromycin; influenza vac typ a&b surf ant; iodides; pcn [penicillins]; and sulfa antibiotics. FAMILY HISTORY     indicated that her mother is . She indicated that her father is . She indicated that her maternal uncle is . She indicated that her maternal cousin is . SOCIAL HISTORY       Social History     Tobacco Use    Smoking status: Never Smoker    Smokeless tobacco: Never Used   Substance Use Topics    Alcohol use: No    Drug use: No     PHYSICAL EXAM     INITIAL VITALS: /69   Pulse 69   Temp 97.7 °F (36.5 °C) (Axillary)   Resp 20   Ht 5' (1.524 m)   Wt 116 lb 8 oz (52.8 kg)   SpO2 95%   BMI 22.75 kg/m²    Physical Exam   Constitutional: She appears well-developed and well-nourished. No distress. HENT:   Head: Normocephalic and atraumatic. Right Ear: External ear normal.   Left Ear: External ear normal.   Nose: Nose normal.   Mouth/Throat: Oropharynx is clear and moist.   Eyes: Pupils are equal, round, and reactive to light. Conjunctivae and EOM are normal. Right eye exhibits no discharge. Left eye exhibits no discharge. Neck: Normal range of motion. Neck supple. No tracheal deviation present. Cardiovascular: Normal rate, regular rhythm, normal heart sounds and intact distal pulses. Pulmonary/Chest: Effort normal and breath sounds normal. No stridor. No respiratory distress. She has no wheezes. She has no rales. She exhibits no tenderness. Abdominal: Soft. Bowel sounds are normal. There is no tenderness. There is no rebound and no guarding. Musculoskeletal: Normal range of motion. She exhibits no edema or tenderness. Neurological: She is alert.    NIH Stroke Scale Assessed  Yes   Interval  Baseline   Level of Consciousness (1a. )  0   LOC Questions (1b. )  2   LOC Commands (1c. )  0   Best Gaze (2. )  0   Visual (3. )  0   Facial Palsy (4. )  1 (left lower face)   Motor Arm, Left (5a. )  2   Motor Arm, Right (5b. )  2   Motor Leg, Left (6a. )  2   Motor Leg, Right (6b. )  2   Limb Ataxia (7. )  0   Sensory (8. )  0   Best Language (9. )  2   Dysarthria (10. )  2   Extinction and Inattention (11)  1 (right side)   Total  16 (calculated)   Eye Opening  4   Best Verbal Response  1   Best Motor Response  6   [place] Coma Scale Score  11 (calculated)        Skin: Skin is warm and dry. Capillary refill takes less than 2 seconds. No rash noted. She is not diaphoretic. No erythema. Psychiatric: Her behavior is normal.       MEDICAL DECISION MAKING:       ED Course as of May 03 2101   Fri May 03, 2019   1634 Code stroke alert protocol for cta head and neck. Premedicating with benadryl and solumedrol for iv contrast.  Family not aware of contrast allergy.    [WM]   26 Dw daughter Kiki Ramos, she states she would not want intubation or life support    [WM]   0 DW Dr. Kelly De Los Santos, no need for transfer. Her reviewed cta. Per the neurointerventionalist, the patient is not eligible for mechanical endovascular reperfusion treatment.        [WM]   1950 Repeat NIHSS 6, family states she is at her baseline mental status and neuro function, we ambulate her in the ED hallway with assistance without difficulty. Calling pcp for admit. Giving asa. Swallow eval.  Not a tpa candidate, back to baseline neuro function with severe alzheimer's dementia. [WM]   2026 Dw Dr. Hedy Gaviria, accepts admit with neuro eval on floor. Family states she is [facility], completing paperwork now.    [WM]      ED Course User Index  [WM] Nuzhat Pollack MD       CRITICAL CARE:   Due to the immediate potential for life-threatening deterioration due to acute stroke symptoms , I spent 30 minutes providing critical care. This time is excluding time spent performing procedures.         PROCEDURES:    Procedures    DIAGNOSTIC RESULTS   EKG:All EKG's are interpreted by the Emergency Department Physician who either signs or Co-signs this chart in the absence of a cardiologist.  NSR with pvcs, normal intervals, no ischemic changes      RADIOLOGY:All plain film, CT, MRI, and formal ultrasound images (except ED bedside ultrasound) are read by the radiologist, see reports below, unless otherwisenoted in MDM or here. CTA Neck W Contrast   Final Result   No flow limiting stenosis or branch occlusion detected within the head or   neck. Incidentally noted thyroid nodules and severe degenerative changes of the   right TMJ. RECOMMENDATIONS:   Managing Incidental Thyroid Nodule Detected at CT or MRI or US      1. Further evaluation by thyroid Ultrasound recommended for these incidental   nodules:      Patient Age 25 years or less - Nodule of any size      Patient Age 21-27 years old - Nodule 1 cm in size or greater      Patient Age 28 years or more - Nodule 1.5 cm in size or greater      2. Follow up thyroid ultrasound also recommend in these scenarios      - Solitary nodule with high risk imaging features (locally invasive nodule or   suspicious lymph nodes)      - Heterogeneous, enlarged thyroid gland.      - Increased uptake on PET      3.  NO further imaging is recommended in the following scenarios      - Any nodule not meeting above criteria. - Those patients with limited life expectancy or significant      Co-morbidities. Note: These recommendations do not apply to pts. w/ increased risk      for thyroid cancer or pts. with symptomatic thyroid disease.      ________________________________________________________________      Recommendations for f/u of Incidental Thyroid Nodules (ITN)      found on CT, MR, NM and Extrathyroidal US are based upon the ACR      white paper and [facility] 3-tiered system for managing ITNs:      J Am Kelvin Radiol. 2015 Feb;12(2): 143-50         CTA Head W Contrast   Final Result   No flow limiting stenosis or branch occlusion detected within the head or   neck. Incidentally noted thyroid nodules and severe degenerative changes of the   right TMJ.       RECOMMENDATIONS:   Managing Incidental Thyroid Nodule Detected at CT or MRI or US      1. Further evaluation by thyroid Ultrasound recommended for these incidental   nodules:      Patient Age 25 years or less - Nodule of any size      Patient Age 21-27 years old - Nodule 1 cm in size or greater      Patient Age 28 years or more - Nodule 1.5 cm in size or greater      2. Follow up thyroid ultrasound also recommend in these scenarios      - Solitary nodule with high risk imaging features (locally invasive nodule or   suspicious lymph nodes)      - Heterogeneous, enlarged thyroid gland.      - Increased uptake on PET      3.  NO further imaging is recommended in the following scenarios      - Any nodule not meeting above criteria. - Those patients with limited life expectancy or significant      Co-morbidities. Note: These recommendations do not apply to pts. w/ increased risk      for thyroid cancer or pts. with symptomatic thyroid disease.      ________________________________________________________________      Recommendations for f/u of Incidental Thyroid Nodules (ITN)      found on CT, MR, NM and Extrathyroidal US are based upon the ACR      white paper and [facility] 3-tiered system for managing ITNs:      J Am Kelvin Radiol. 2015 Feb;12(2): 143-50         XR CHEST PORTABLE   Final Result   Left basilar subsegmental atelectasis. No acute cardiopulmonary process. LABS: All lab results were reviewed by myself, and all abnormals are listed below.   Labs Reviewed   CBC WITH AUTO DIFFERENTIAL - Abnormal; Notable for the following components:       Result Value    Seg Neutrophils 76 (*)     Lymphocytes 15 (*)     All other components within normal limits   COMPREHENSIVE METABOLIC PANEL - Abnormal; Notable for the following components:    Glucose 117 (*)     Chloride 97 (*)     All other components within normal limits   TROPONIN - Abnormal; Notable for the following components:    Troponin, High Sensitivity 27 (*)     All other components within normal limits   TROPONIN - Abnormal; Notable for the following components:    Troponin, High Sensitivity 20 (*)     All other components within normal limits   CREATININE W/GFR POINT OF CARE - Abnormal; Notable for the following components:    GFR Non- 51 (*)     All other components within normal limits   URINE CULTURE CLEAN CATCH   MAGNESIUM   PROTIME-INR   ETHANOL   URINALYSIS       EMERGENCY DEPARTMENTCOURSE:         Vitals:    Vitals:    05/03/19 2000 05/03/19 2015 05/03/19 2030 05/03/19 2045   BP: 136/71 115/79 134/76 137/69   Pulse:       Resp:       Temp:       TempSrc:       SpO2:       Weight:       Height:           The patient was given the following medications while in the emergency department:  Orders Placed This Encounter   Medications    methylPREDNISolone sodium (SOLU-MEDROL) injection 125 mg    diphenhydrAMINE (BENADRYL) injection 25 mg    methylPREDNISolone sodium (SOLU-MEDROL) 125 MG injection     CHAMBERS AURY: cabinet override    diphenhydrAMINE (BENADRYL) 50 MG/ML injection     CHAMBERSAURY: cabinet override    sodium chloride flush 0.9 % injection 10 mL    0.9 % sodium chloride bolus    ioversol (OPTIRAY) 74 % injection 75 mL    aspirin tablet 325 mg     CONSULTS:  IP CONSULT TO FAMILY MEDICINE  IP CONSULT TO NEUROLOGY    FINAL IMPRESSION      1. Syncope and collapse    2.  TIA (transient ischemic attack)          DISPOSITION/PLAN   DISPOSITION  Admit to pcu      PATIENT REFERRED TO:  Candy Hermosillo [address] [facility]  [address] [facility],[address]  [address]            Mily Small MD  Attending Emergency Physician                    Mily Small MD  05/03/19 2027      EKG updated       Mily Small MD  05/03/19 2101

## 2019-05-03 NOTE — ED NOTES
Pt is nonverbal and unable to answer the questions about suicide or feeling safe in her home. Pt's  cares for pt on his own. Pt only eats pureed, dental soft and normal fluids. Pills crushed for administration. Pt's  and daughter answered all other triage questions.        Larry Griffin RN  05/03/19 6410

## 2019-05-03 NOTE — ED NOTES
Dr Harris Fischer at bedside updating pt's daughter of findings and orders placed.        Bhanu Garrett RN  05/03/19 9741

## 2019-05-03 NOTE — ED NOTES
..    5/3/2019 4:40 PM    Patient: Ina Leonard, 77 y.o., female Race: [facility]  Patient Address: [address] [address]  Incident Address:  [x]Same as patient address     [x] [facility] [place]  [] [facility]  [] [facility] ORTHOPEDIC AND SPINE [facility] AT [place]   [] [facility]AG[facility]  [] Counts include 234 beds at the [facility]  Patient Phone #: [phone]   Insurance: Payor: Milad Martinez /  /  /   [employer]:  []  Yes   [x]  No  Emergency Contact: Extended Emergency Contact Information  Primary Emergency Contact: Mike Barrientos  Address: [address] [place] [address] At [facility], [address] [place] [address] Phone: [phone]  Work Phone: [phone]  Mobile Phone: [phone]  Relation: Spouse  Secondary Emergency Contact: Lewayne Phalen  Address: [address] [place] [address]  Home Phone: [phone]  Relation: Child  MRN: [id number]  [facility]# [id number]  YOB: 1950  Primary Care Physician: Pernell Horan MD  Advance Directive: [x] Full Code   []DNR-CC   []DNR-CCA    MSU Crew: RENÉE Bryan - CT Tech, T Laurie - Paramedic,  Humberto Mensah - CICI    Pt Transported To:  [] [facility]  [] [facility]  [x] [facility]  [] [facility]  [] [facility]  [] [email or web address]  [facility] [] [place]     Was patient transported to closest facility? [x]Yes  []No (if No specify reason)   []   Patient/family request    []   Divert to specialist       Response Code   [] 2  [x] 3  []   Change  [] 2  [] 3   Transport Code   [x] 2  [] 3  []   Change  [] 2  [] 3     Mileage:  [facility]   Total Miles 5.1     Call Received [phone]   Arrived Scene [phone]   At Patient [phone]   Decision to Scan [phone]   Departed Scene 10 42 [facility][note 3]   Departed [phone]   In Service 1629         Allergies  [] Updated/Reviewed by MSU  [x] Historical Data Only  [] Unavailable  is allergic to cephalexin; erythromycin; influenza vac typ a&b surf ant; iodides; pcn [penicillins]; and sulfa antibiotics.   Medications  [] Updated/Reviewed by MS  [x] Historical Data Only  [] Unavailable  Prior to Admission medications    Medication Sig Start Date End Date Taking? Authorizing Provider   lisinopril (PRINIVIL;ZESTRIL) 20 MG tablet TAKE 1 TABLET DAILY 2/25/19   DO Tirado CNP   levothyroxine (SYNTHROID) 25 MCG tablet TAKE 1 TABLET DAILY 2/13/19   DO Tirado CNP   traZODone (DESYREL) 50 MG tablet TAKE 1 TABLET EVERY NIGHT AT BEDTIME 2/7/19   DO Tirado CNP   amLODIPine (NORVASC) 5 MG tablet TAKE 1 TABLET DAILY 1/29/19   Pamela Doshi MD   omeprazole (PRILOSEC) 40 MG delayed release capsule TAKE 1 CAPSULE DAILY 11/28/18   Arjun Ibarra MD   Nutritional Supplements (ENSURE HIGH PROTEIN) LIQD Take 1 Can by mouth 3 times daily (with meals) 6/4/18   Pamela Doshi MD   memantine (NAMENDA) 10 MG tablet Take 10 mg by mouth 2 times daily    Historical Provider, MD   Multiple Vitamins-Minerals (MULTIVITAMIN WOMEN 50+ PO) Take 1 tablet by mouth daily    Historical Provider, MD   busPIRone (BUSPAR) 5 MG tablet TAKE 1 TABLET TWICE A DAY 1/12/17   Alexi FavoriteDO CNP   isosorbide mononitrate (IMDUR) 30 MG extended release tablet TAKE 1 TABLET DAILY 11/30/16   Pamela Doshi MD   ZETIA 10 MG tablet 1 tablet daily 5/6/16   Historical Provider, MD   NITROSTAT 0.4 MG SL tablet  10/7/15   Historical Provider, MD   aspirin 81 MG tablet Take 81 mg by mouth daily.     Historical Provider, MD    Scheduled Meds:  Continuous Infusions:  PRN Meds:.  Past Medical History  [] Updated/Reviewed by MSU  [x] Historical Data Only  [] Unavailable   has a past medical history of CAD (coronary artery disease), Cataracts, bilateral, Colon polyps, Dementia, Gastric ulcer, GERD (gastroesophageal reflux disease), Hiatal hernia, History of colon polyps, Hyperlipidemia, Hypertension, Hypothyroidism (acquired), Laryngitis, MI (myocardial infarction) ([facility] [address] Tubular adenoma of colon, and Villous adenoma of colon. Patient Weight: 116 lbs   []   Estimated   [x]   Stated          Vitals          Time BP Pulse   Resp  Rate N-normal  S-shallow  D-deep Monitor SpO2 O2    LPM BGL   Temp Pain                1551 130/85 72 18 N NSR 96 RA  NA NA   1600 124/80 73 22 N NSR 98 RA      1605 136/75 72 16 N NSR 96 RA      1610 131/76 74 18 N NSR 96 RA                      pupils GCS   Time R L E  4 V  5 M  6 T  15   1530 3 3 4 NA 6                                            NIH -   record on all transports and Q15 min after tPA administration    NIHSS       Time 1535      1a. LOC - Arousal Status 0      1b. LOC - Questions ALVIN      1c. LOC - Commands ALVIN      2. Eye Movements (gaze) 0      3. Visual Fields 0      4. Facial Palsy 0      5a. Motor Left Arm 0      5b. Motor Right Arm 0      6a. Motor Left Leg 0      6b. Motor Right Leg 0      7. Limb Ataxia 0      8. Sensory 0      9. Language/Aphasia ALVIN      10. Dysarthria ALVIN      11. Neglect 0      TOTAL 0          Research:    RACE Score: 0 Time: 9384  StrokeVAN Score: neg Time:1530    Time Last Known Well:  1500    Narrative:    Dispatched to possible CVA per [facility]. Arrived to find Ina Bayamon, 77 y.o., female c/o right side weakness and not responding. Report received from #8 EMS at patients side.  at patient side via telemedicine unit. Daughter states that at approx 1500 the pt developed right side weakness and unable to move right side. Pt then became unresponsive for daughter lasting approx 6 to 8 min before coming back around. Pt color became gray and daughter stated she did give her 1 compression to her chest. Pt has advanced dementia and has been non verbal for about 1 year. Upon our arrival pt is awake sitting up in chair. Color grayish. Pt moving all extremities but not following all commands given. Dr. Summer Mejai ordered CT scan to be completed and transport to [facility]. Scan completed and Tried to establish IV access but was unable to do so.  Bld sugar per fingerstick done and was 114. Report [facility] to [facility]. Patient received the following medications prior to MSU arrival: NA  Patient has the following lines prior to MSU arrival: NA  Patient has the following airway placed prior to MSU arrival: NA    Patient was transferred to cot via 2 person assist and secured with straps x3. Zoll ECG, SpO2, and NIBP applied and monitored throughout encounter. HOB maintained at 30 degrees. Patient was transferred to ambulance, cot secured in scan position. Non contrast CT scan performed. After scan cot secured in transport position. IV tPA inclusion/exclusion criteria reviewed with patient and physician. Candidate for IV tPA therapy? [] Yes   [x] No - due to the following exclusion criteria: quickly improving symptoms    Patient is being [facility] [place].   During transport patient rests comfortably. Cabin temp maintained at 70-72 °F throughout transport. Patient was transferred to bed ER # 11 via 4 person sheet lift. . Patient care handoff completed with Dr. Wolf Soto at bedside. Imaging:  Images were obtained onboard the MSU including:  [x] CT brain without contrast - see results below:      Ct Head Wo Contrast    Result Date: 5/3/2019  EXAMINATION: CT OF THE HEAD WITHOUT CONTRAST  5/3/2019 3:38 pm TECHNIQUE: CT of the head was performed without the administration of intravenous contrast. Dose modulation, iterative reconstruction, and/or weight based adjustment of the mA/kV was utilized to reduce the radiation dose to as low as reasonably achievable. COMPARISON: MRI brain performed 02/21/2017. HISTORY: ORDERING SYSTEM PROVIDED HISTORY: STROKE TECHNOLOGIST PROVIDED HISTORY: FINDINGS: The examination is mildly compromised related to motion artifact. BRAIN/VENTRICLES: There is no acute intracranial hemorrhage, mass effect, or midline shift. There is satisfactory overall gray-white matter differentiation.   There is

## 2019-05-03 NOTE — CONSULTS
Stroke and NeuroInterventional Mobile Stroke Unit Note for  MSU Alert @ 15:17  5/3/2019 3:54 PM    Pt Name: Sandra Joseph  MRN: [id number]  [place]: 1950  Date of evaluation: 5/3/2019  Primary Care Physician: Sunny Carroll MD    For Detailed info and time metrics, please see the Mobile Stroke Unit note       Sandra Joseph is a 77 y.o. female who presents with history of advanced dementia, mute at baseline but usually able to follow commands last well around 15:00 when she was noted to have passed out eyes rolled back and ashen. 7-8 min period with flaccid right extremities. She has slowly recovered but with persistent receptive aphasia but moving all extremities when mimicing. No iv tpa as rapid improvement of deficits. sbp 140's. Head ct on msu limited by motion with no clear evidence of hemorrhage. + atrophy      Allergies  is allergic to cephalexin; erythromycin; influenza vac typ a&b surf ant; iodides; pcn [penicillins]; and sulfa antibiotics. Medications  Prior to Admission medications    Medication Sig Start Date End Date Taking?  Authorizing Provider   lisinopril (PRINIVIL;ZESTRIL) 20 MG tablet TAKE 1 TABLET DAILY 2/25/19   Cyd Brittle, APRN - CNP   levothyroxine (SYNTHROID) 25 MCG tablet TAKE 1 TABLET DAILY 2/13/19   Cyd Brittle, APRN - CNP   traZODone (DESYREL) 50 MG tablet TAKE 1 TABLET EVERY NIGHT AT BEDTIME 2/7/19   Cyd Brittle, APRN - CNP   amLODIPine (NORVASC) 5 MG tablet TAKE 1 TABLET DAILY 1/29/19   Sunny Carroll MD   omeprazole (PRILOSEC) 40 MG delayed release capsule TAKE 1 CAPSULE DAILY 11/28/18   Elly Mercado MD   Nutritional Supplements (ENSURE HIGH PROTEIN) LIQD Take 1 Can by mouth 3 times daily (with meals) 6/4/18   Sunny Carroll MD   memantine (NAMENDA) 10 MG tablet Take 10 mg by mouth 2 times daily    Historical Provider, MD   Multiple Vitamins-Minerals (MULTIVITAMIN WOMEN 50+ PO) Take 1 tablet by mouth daily    Historical Provider, MD busPIRone (BUSPAR) 5 MG tablet TAKE 1 TABLET TWICE A DAY 1/12/17   Hunter Roberts, APRN - CNP   isosorbide mononitrate (IMDUR) 30 MG extended release tablet TAKE 1 TABLET DAILY 11/30/16   Pernell Horna MD   ZETIA 10 MG tablet 1 tablet daily 5/6/16   Historical Provider, MD   NITROSTAT 0.4 MG SL tablet  10/7/15   Historical Provider, MD   aspirin 81 MG tablet Take 81 mg by mouth daily. Historical Provider, MD    Scheduled Meds:  Continuous Infusions:  PRN Meds:.  Past Medical History   has a past medical history of CAD (coronary artery disease), Cataracts, bilateral, Colon polyps, Dementia, Gastric ulcer, GERD (gastroesophageal reflux disease), Hiatal hernia, History of colon polyps, Hyperlipidemia, Hypertension, Hypothyroidism (acquired), Laryngitis, MI (myocardial infarction) ([facility] Tubular adenoma of colon, and Villous adenoma of colon.   Social History  Social History     Socioeconomic History    Marital status:      Spouse name: Not on file    Number of children: Not on file    Years of education: Not on file    Highest education level: Not on file   Occupational History    Not on file   Social Needs    Financial resource strain: Not on file    Food insecurity:     Worry: Not on file     Inability: Not on file    Transportation needs:     Medical: Not on file     Non-medical: Not on file   Tobacco Use    Smoking status: Never Smoker    Smokeless tobacco: Never Used   Substance and Sexual Activity    Alcohol use: No    Drug use: No    Sexual activity: Not on file   Lifestyle    Physical activity:     Days per week: Not on file     Minutes per session: Not on file    Stress: Not on file   Relationships    Social connections:     Talks on phone: Not on file     Gets together: Not on file     Attends Rastafari service: Not on file     Active member of club or organization: Not on file     Attends meetings of clubs or organizations: Not on file     Relationship status: Not on file [facility] [place] [address]  Electronically signed 5/3/2019 at 3:54 PM

## 2019-05-03 NOTE — ED NOTES
Mode of arrival:  EMS      Residence prior to admit: home      Chief complaint on admission: right sided flaccid  Pt's daughter at bedside. Daughter states that she was present feeding pt lunch. Daughter states that pt became gray and right side flaccid. Daughter states that she did perform CPR with pt seated in a chair d/t feeling no pulse initially. Daughter states that pt did awaken after some time. Pt is nonverbal, eats a pureed diet, and does ambulate per self. C= \"Have you ever felt that you should Cut down on your drinking? \"  No  A= \"Have people Annoyed you by criticizing your drinking? \"  No  G= \"Have you ever felt bad or Guilty about your drinking? \"  No  E= \"Have you ever had a drink as an Eye-opener first thing in the morning to steady your nerves or to help a hangover? \"  No      Deferred []      Reason for deferring: N/A    *If yes to two or more: probable alcohol abuse. [address] [facility], RN  05/03/19 6308

## 2019-05-03 NOTE — ED NOTES
Bed: 11  Expected date:   Expected time:   Means of arrival:   Comments:     Leilani Ceballos RN  05/03/19 9641

## 2019-05-04 PROBLEM — E43 SEVERE MALNUTRITION (HCC): Status: ACTIVE | Noted: 2019-05-04

## 2019-05-04 LAB
BILIRUBIN URINE: NEGATIVE
COLOR: YELLOW
COMMENT UA: ABNORMAL
GLUCOSE URINE: NEGATIVE
KETONES, URINE: NEGATIVE
LEUKOCYTE ESTERASE, URINE: NEGATIVE
NITRITE, URINE: NEGATIVE
PH UA: 8.5 (ref 5–8)
PROTEIN UA: NEGATIVE
SPECIFIC GRAVITY UA: 1.04 (ref 1–1.03)
TURBIDITY: CLEAR
URINE HGB: NEGATIVE
UROBILINOGEN, URINE: NORMAL

## 2019-05-04 PROCEDURE — 99220 PR INITIAL OBSERVATION CARE/DAY 70 MINUTES: CPT | Performed by: PSYCHIATRY & NEUROLOGY

## 2019-05-04 PROCEDURE — G0378 HOSPITAL OBSERVATION PER HR: HCPCS

## 2019-05-04 PROCEDURE — 87186 SC STD MICRODIL/AGAR DIL: CPT

## 2019-05-04 PROCEDURE — 96372 THER/PROPH/DIAG INJ SC/IM: CPT

## 2019-05-04 PROCEDURE — 6370000000 HC RX 637 (ALT 250 FOR IP): Performed by: FAMILY MEDICINE

## 2019-05-04 PROCEDURE — 97162 PT EVAL MOD COMPLEX 30 MIN: CPT

## 2019-05-04 PROCEDURE — 2580000003 HC RX 258: Performed by: FAMILY MEDICINE

## 2019-05-04 PROCEDURE — 6360000002 HC RX W HCPCS: Performed by: FAMILY MEDICINE

## 2019-05-04 PROCEDURE — 87086 URINE CULTURE/COLONY COUNT: CPT

## 2019-05-04 PROCEDURE — 87088 URINE BACTERIA CULTURE: CPT

## 2019-05-04 RX ORDER — TRAZODONE HYDROCHLORIDE 50 MG/1
50 TABLET ORAL NIGHTLY
Status: DISCONTINUED | OUTPATIENT
Start: 2019-05-04 | End: 2019-05-06 | Stop reason: HOSPADM

## 2019-05-04 RX ORDER — OMEPRAZOLE 40 MG/1
40 CAPSULE, DELAYED RELEASE ORAL DAILY
Status: DISCONTINUED | OUTPATIENT
Start: 2019-05-04 | End: 2019-05-06 | Stop reason: HOSPADM

## 2019-05-04 RX ORDER — MEMANTINE HYDROCHLORIDE 10 MG/1
10 TABLET ORAL DAILY
Status: DISCONTINUED | OUTPATIENT
Start: 2019-05-04 | End: 2019-05-04

## 2019-05-04 RX ORDER — LISINOPRIL 20 MG/1
20 TABLET ORAL DAILY
Status: DISCONTINUED | OUTPATIENT
Start: 2019-05-04 | End: 2019-05-06 | Stop reason: HOSPADM

## 2019-05-04 RX ORDER — AMLODIPINE BESYLATE 5 MG/1
5 TABLET ORAL DAILY
Status: DISCONTINUED | OUTPATIENT
Start: 2019-05-04 | End: 2019-05-06 | Stop reason: HOSPADM

## 2019-05-04 RX ORDER — LEVOTHYROXINE SODIUM 0.03 MG/1
25 TABLET ORAL DAILY
Status: DISCONTINUED | OUTPATIENT
Start: 2019-05-04 | End: 2019-05-06 | Stop reason: HOSPADM

## 2019-05-04 RX ORDER — ISOSORBIDE MONONITRATE 30 MG/1
30 TABLET, EXTENDED RELEASE ORAL DAILY
Status: DISCONTINUED | OUTPATIENT
Start: 2019-05-04 | End: 2019-05-06 | Stop reason: HOSPADM

## 2019-05-04 RX ORDER — MEMANTINE HYDROCHLORIDE 10 MG/1
10 TABLET ORAL 2 TIMES DAILY
Status: DISCONTINUED | OUTPATIENT
Start: 2019-05-04 | End: 2019-05-06 | Stop reason: HOSPADM

## 2019-05-04 RX ORDER — TRAZODONE HYDROCHLORIDE 50 MG/1
50 TABLET ORAL NIGHTLY
Status: DISCONTINUED | OUTPATIENT
Start: 2019-05-04 | End: 2019-05-04

## 2019-05-04 RX ORDER — TRAZODONE HYDROCHLORIDE 50 MG/1
50 TABLET ORAL NIGHTLY
Status: DISCONTINUED | OUTPATIENT
Start: 2019-05-04 | End: 2019-05-04 | Stop reason: SDUPTHER

## 2019-05-04 RX ORDER — MEMANTINE HYDROCHLORIDE 10 MG/1
10 TABLET ORAL ONCE
Status: COMPLETED | OUTPATIENT
Start: 2019-05-04 | End: 2019-05-04

## 2019-05-04 RX ADMIN — MULTIPLE VITAMINS W/ MINERALS TAB 1 TABLET: TAB at 09:20

## 2019-05-04 RX ADMIN — ENOXAPARIN SODIUM 40 MG: 100 INJECTION SUBCUTANEOUS at 09:54

## 2019-05-04 RX ADMIN — LEVOTHYROXINE SODIUM 25 MCG: 0.03 TABLET ORAL at 05:04

## 2019-05-04 RX ADMIN — MEMANTINE HYDROCHLORIDE 10 MG: 10 TABLET ORAL at 20:48

## 2019-05-04 RX ADMIN — ASPIRIN 162 MG: 81 TABLET, COATED ORAL at 09:54

## 2019-05-04 RX ADMIN — Medication 10 ML: at 01:03

## 2019-05-04 RX ADMIN — MEMANTINE 10 MG: 10 TABLET ORAL at 01:00

## 2019-05-04 RX ADMIN — Medication 10 ML: at 20:48

## 2019-05-04 RX ADMIN — OMEPRAZOLE 40 MG: 40 CAPSULE, DELAYED RELEASE ORAL at 09:53

## 2019-05-04 RX ADMIN — TRAZODONE HYDROCHLORIDE 50 MG: 50 TABLET ORAL at 20:48

## 2019-05-04 RX ADMIN — LISINOPRIL 20 MG: 20 TABLET ORAL at 09:53

## 2019-05-04 RX ADMIN — AMLODIPINE BESYLATE 5 MG: 5 TABLET ORAL at 09:52

## 2019-05-04 RX ADMIN — MEMANTINE HYDROCHLORIDE 10 MG: 10 TABLET ORAL at 09:52

## 2019-05-04 RX ADMIN — Medication 10 ML: at 13:31

## 2019-05-04 RX ADMIN — ISOSORBIDE MONONITRATE 30 MG: 30 TABLET, EXTENDED RELEASE ORAL at 09:53

## 2019-05-04 RX ADMIN — TRAZODONE HYDROCHLORIDE 50 MG: 50 TABLET ORAL at 00:58

## 2019-05-04 NOTE — PROGRESS NOTES
Nutrition Assessment    Type and Reason for Visit: Positive Nutrition Screen(Unplanned weight loss, decreased appetite, difficulty chewing and swallowing)    Nutrition Recommendations: Modify diet to Cardiac, Pureed diet. Start Ensure Enlive 3x/day. Nutrition Assessment: Patient is severely malnourished as evidence by poor p.o intakes, and severe muscle a fat loss. Patient is at risk for further compromise due to significant weight loss, poor p.o intakes related to dementia. Patient consumes mostly liquid/ thin puree diet and needs to be fed. Diet changed to Pureed. Will start Ensure Enlive 3x/day. Will monitor p.o intakes, chewing and swallowing ability     Malnutrition Assessment:  · Malnutrition Status: Meets the criteria for severe malnutrition  · Context: Chronic illness  · Findings of the 6 clinical characteristics of malnutrition (Minimum of 2 out of 6 clinical characteristics is required to make the diagnosis of moderate or severe Protein Calorie Malnutrition based on AND/ASPEN Guidelines):  1. Energy Intake-Less than or equal to 50% of estimated energy requirement, Greater than or equal to 1 month    2. Weight Loss-10% loss or greater, (5 months)  3. Fat Loss-Severe subcutaneous fat loss, Triceps  4. Muscle Loss-Severe muscle mass loss, Temples (temporalis muscle), Clavicles (pectoralis and deltoids)  5. Fluid Accumulation- , Extremities  6.  Strength-Not measured    Nutrition Risk Level:  Moderate    Nutrient Needs:  · Estimated Daily Total Kcal: 5130-6373 kcals based on 25-28 kcal/ kg of admission weight   · Estimated Daily Protein (g): 50-60 gm of protein based on 1-1.2 gm/ kg of admission weight     Nutrition Diagnosis:   · Problem: Inadequate oral intake  · Etiology: related to Cognitive or neurological impairment(advanced dementia)     Signs and symptoms:  as evidenced by Diet history of poor intake, Weight loss, Severe loss of subcutaneous fat, Severe muscle loss, Weight loss greater than or equal to 7.5% in 3 months(mostly liquid/ pureed diet, unable to fed self)    Objective Information:  · Nutrition-Focused Physical Findings: Advanced dementia- requires cues and needs to be fed  · Current Nutrition Therapies:  · Oral Diet Orders: Dysphagia 3, Cardiac   · Oral Diet intake: 1-25%  · Anthropometric Measures:  · Ht: 5' (152.4 cm)   · Current Body Wt: 110 lb (49.9 kg)  · Admission Body Wt: 110 lb (49.9 kg)  · Usual Body Wt: 125 lb (56.7 kg)(12/6/18)  · % Weight Change:  ,  12% loss in 5 months  · Ideal Body Wt: 100 lb (45.4 kg), % Ideal Body 110%  · BMI Classification: BMI 18.5 - 24.9 Normal Weight    Nutrition Interventions:   Start ONS, Modify current diet  Continued Inpatient Monitoring    Nutrition Evaluation:   · Evaluation: Goals set   · Goals: PO intakes are % at meals    · Monitoring: Meal Intake, Weight, Pertinent Labs, Nutrition Progression, Chewing/Swallowing, Monitor Bowel Function, Supplement Intake, Diet Tolerance, Skin Integrity, I&O      Jimmie ALCARAZ, R.D, L.D,  Clinical Dietitian  Cell # [phone]  Office # [phone]

## 2019-05-04 NOTE — PLAN OF CARE
Nutrition Problem: Inadequate oral intake  Intervention: Food and/or Nutrient Delivery: Start ONS, Modify current diet  Nutritional Goals: PO intakes are % at meals

## 2019-05-04 NOTE — CARE COORDINATION
Bonita Jackson was evaluated today and a DME order was entered for a lightweight wheelchair because she requires this to successfully complete daily living tasks of bathing, personal cares, ambulating, grooming and hygiene. A lightweight wheelchair is necessary due to the patient's impaired ambulation and mobility restrictions. The patient would not be able to resolve these daily living tasks using a cane or walker. The patient can self-propel a lightweight wheelchair safely in their home and can maneuver within their home with adequate access. The patient cannot self-propel in a standard wheelchair. The need for this equipment was discussed with the patient and she understands, is in agreement, and has not expressed an unwillingness to use the wheelchair.

## 2019-05-04 NOTE — PROGRESS NOTES
Physical Therapy    Facility/Department: [facility] PROGRESSIVE CARE  Initial Assessment    NAME: Lisle Schwab  : 1950  MRN: [id number]    Date of Service: 2019    Discharge Recommendations:  24 hour supervision or assist(or SNF if family unable to provide 24/7 assist.)        Assessment   Body structures, Functions, Activity limitations: Decreased functional mobility ; Decreased safe awareness;Decreased balance  Assessment: Pt needs CGA/HHA for functional mobility, pt seems to have some receptive aphasia, not safe. Recommend 24/7 assist at [facility]. to perofrm functional mobility with out assist  Treatment Diagnosis: Impaired mobility  Prognosis: Fair  Decision Making: Medium Complexity  Patient Education: Unable to educate pt due to demantia. Barriers to Learning: Demantia  REQUIRES PT FOLLOW UP: Yes       Patient Diagnosis(es): The primary encounter diagnosis was Syncope and collapse. A diagnosis of TIA (transient ischemic attack) was also pertinent to this visit. has a past medical history of CAD (coronary artery disease), Cataracts, bilateral, Colon polyps, Dementia, Gastric ulcer, GERD (gastroesophageal reflux disease), Hiatal hernia, History of colon polyps, Hyperlipidemia, Hypertension, Hypothyroidism (acquired), Laryngitis, MI (myocardial infarction) ([facility] [address] Tubular adenoma of colon, and Villous adenoma of colon. has a past surgical history that includes Appendectomy; joint replacement (Right, dec 2014); joint replacement (Left, ); Tonsillectomy; Colonoscopy (); Upper gastrointestinal endoscopy (10/09/2017); other surgical history; other surgical history (Right, 03/19/15); Coronary angioplasty with stent (1999); Colonoscopy (2017); Upper gastrointestinal endoscopy (2017); and Sigmoidoscopy (10/09/2017).     Restrictions  Restrictions/Precautions  Restrictions/Precautions: Fall Risk(Telesitter)  Position Activity Restriction  Other position/activity restrictions: Pt is aphasic at baseline, severe demantia. Vision/Hearing  Vision: (ALVIN)  Hearing: (ALVIN)     Subjective  General  Patient assessed for rehabilitation services?: Yes  Additional Pertinent Hx: Per ER note- DW daughter Priscila Teresa, she states they were feeding her puree food at 3 pm, Sonam Munroe pushed it away, then had syncope event and right side seemed to be flacid. She briefly did some chest compressions, she called EMS for stroke alert. She is much improved now, aphasic at baseline. Daughter states she has severe advanced dementia, lives at home with family. Family / Caregiver Present: (No family present)  Referral Date : 05/04/19  Diagnosis: TIA. Follows Commands: Impaired  Other (Comment): Pt is aphasic (Mute at baseline) with severe demantia. Orientation; Impaired-dementia     Social/Functional History  Social/Functional History  Lives With: Family  Additional Comments: No family presetn , pt unable to reports Home living situation( Pt mute at baseline). Per chart pt's spouse is caregiver for pt at home. Cognition : impaired-dementia       Objective          PROM RLE (degrees)  RLE PROM: WFL  PROM LLE (degrees)  LLE PROM: WFL  PROM RUE (degrees)  RUE PROM: WFL  PROM LUE (degrees)  LUE PROM: WFL  Strength Other  Other: Pt unable to follow commands for MMT, but able to assist therapist when therapist moving her extremities   Pt has severe dementia. Sensation  Overall Sensation Status: (ALVIN)  Bed mobility  Rolling to Left: Minimal assistance  Rolling to Right: Minimal assistance  Supine to Sit: Minimal assistance  Sit to Supine: Minimal assistance  Comment: Pt needa max tactile cues to initate tasks, pt does not follow commands, but will start assist therapist as therapist guides her through the motion/movement.    Transfers  Sit to Stand: Minimal Assistance;Contact guard assistance  Stand to sit: Contact guard assistance;Minimal Assistance  Ambulation  Ambulation?: Yes  Ambulation 1  Surface: level

## 2019-05-04 NOTE — FLOWSHEET NOTE
05/04/19 1100   Encounter Summary   Services provided to: Patient   Referral/Consult From: Darren   Continue Visiting   (5/4/19)   Volunteer Visit Yes   Complexity of Encounter Low   Length of Encounter 15 minutes   Spiritual/Holiness   Type Spiritual support   Intervention Prayer

## 2019-05-04 NOTE — ED NOTES
Report given to Woody Peguero RN from [facility]. Admit to 2120. Report method by phone   The following was reviewed with receiving RN:   Current vital signs:  /69   Pulse 69   Temp 97.7 °F (36.5 °C) (Axillary)   Resp 20   Ht 5' (1.524 m)   Wt 116 lb 8 oz (52.8 kg)   SpO2 95%   BMI 22.75 kg/m²                MEWS Score: 1     Any medication or safety alerts were reviewed. Any pending diagnostics and notifications were also reviewed, as well as any safety concerns or issues, abnormal labs, abnormal imaging, and abnormal assessment findings. Questions were answered.             Fredis Sanchez RN  05/03/19 0130

## 2019-05-04 NOTE — PROGRESS NOTES
Pateint admitted per cart to room 2120. Daughter present. Patient nonverbal but able to stand and pivot from cart to bed. VS taken and telemetry applied. Bed alarm placed.

## 2019-05-04 NOTE — CONSULTS
[facility] Neurology   IN-PATIENT SERVICE      NEUROLOGY CONSULT  NOTE            Date:   5/4/2019  Patient name:  Louann Del Valle  Date of admission:  5/3/2019  YOB: 1950      Chief Complaint:     Chief Complaint   Patient presents with    Cerebrovascular Accident     stroke alert       Reason for Consult:      TIA/syncope     History of Present Illness: The patient is a 77 y.o. female who presents with Cerebrovascular Accident (stroke alert)   and she is admitted to the hospital for the management of  TIA/syncope. The patient was seen and examined and the chart was reviewed.  at bedside providing history. Patient has hx of advanced dementia diagnosed about 4 years ago, following with Dr. Goyal Friend. She is non verbal at baseline and lives at home with her . She is still able to ambulate and follows most commands. She is on a pureed type diet and he is constantly trying to get her to eat and drink but is not sure if she is taking in as much as she needs to. States she is ambulating 20,000 steps per day so burns a lot of what she eats. He denies any behavioral disturbances of dementia and states she is very pleasant to be around. On Namenda 10 mg BID. Patient had episode yesterday in which she was sitting on a chair and passed out with eyes rolling back and noted to have flaccid right extremities. Deficits improved rapidly, lasting about 8 minutes or so. No evidence of jerking, or convulsing type movements. No foaming at the mouth or tongue biting.  did state she was wet though as if she had bladder incontinence when she came in. Dr. Rafa Hatch contacted over [facility] and CT head with no acute process, significant atrophy. No TPA decided to be given due to rapid resolution of symptoms. BP was in the 140 range on arrival.  states she has had an episode similar to this back in July, 2018 and she was hospitalized.  At the time they were outside taking a picture together with family and he told her to turn around and next thing he knew she passed out. That episode only lasted for one minute or so. No witnessed seizure like activity that time either. Patient admitted for syncopal work up. At this time she appears back at her normal baseline which is lying in bed calmly, comfortably and non verbal per the . Nurse states she was able to ambulate up and down the hallway without any issues today. She is eating lunch at this time with the help of her . Hx CAD, HTN. On  mg daily.      Past Medical History:     Past Medical History:   Diagnosis Date    CAD (coronary artery disease)     Cataracts, bilateral     Colon polyps 08/2017    Dementia     Gastric ulcer 08/2017    GERD (gastroesophageal reflux disease) 12/16/2013    Hiatal hernia     History of colon polyps 08/14/2017    Hyperlipidemia 6/13/2013    Hypertension     Hypothyroidism (acquired) 3/25/2017    Laryngitis     MI (my[facility] infarction) ([facility] [address]     Tubular adenoma of colon 10/19/2017    Villous adenoma of colon 08/2017        Past Surgical History:     Past Surgical History:   Procedure Laterality Date    APPENDECTOMY      COLONOSCOPY  2009    COLONOSCOPY  08/14/2017    2.5 cm leion anorectal area-pathology-multiple fragments of villous and villotubular adenomas (most of the polyps removed), right colon--tubular adenoma    CORONARY ANGIOPLASTY WITH STENT PLACEMENT  01/28/1999    JOINT REPLACEMENT Right dec 2014    right shoulder    JOINT REPLACEMENT Left 2012    left knee    OTHER SURGICAL HISTORY      botox injections to vocal cords    OTHER SURGICAL HISTORY Right 03/19/15    I & D rt abd abscess    SIGMOIDOSCOPY  10/09/2017    Minimal probable hypertrophied mucosa at the previous polypectomy site, pathology tubular adenoma    TONSILLECTOMY      UPPER GASTROINTESTINAL ENDOSCOPY  10/09/2017    ulcer is healed, patient has significant esophagitis    UPPER GASTROINTESTINAL ENDOSCOPY 08/14/2017    ULCER AT GE JUNCTION, 2-3 CM LONG HIATAL HERNIA        Medications Prior to Admission:     Prior to Admission medications    Medication Sig Start Date End Date Taking? Authorizing Provider   lisinopril (PRINIVIL;ZESTRIL) 20 MG tablet TAKE 1 TABLET DAILY 2/25/19  Yes DO Farfan CNP   levothyroxine (SYNTHROID) 25 MCG tablet TAKE 1 TABLET DAILY 2/13/19  Yes DO Farfan CNP   traZODone (DESYREL) 50 MG tablet TAKE 1 TABLET EVERY NIGHT AT BEDTIME 2/7/19  Yes DO Farfan CNP   amLODIPine (NORVASC) 5 MG tablet TAKE 1 TABLET DAILY 1/29/19  Yes Phoenix Mcgrath MD   omeprazole (PRILOSEC) 40 MG delayed release capsule TAKE 1 CAPSULE DAILY 11/28/18  Yes Felisha Cha MD   Nutritional Supplements (ENSURE HIGH PROTEIN) LIQD Take 1 Can by mouth 3 times daily (with meals) 6/4/18  Yes Phoenix Mcgrath MD   memantine (NAMENDA) 10 MG tablet Take 10 mg by mouth 2 times daily   Yes Historical Provider, MD   Multiple Vitamins-Minerals (MULTIVITAMIN WOMEN 50+ PO) Take 1 tablet by mouth daily   Yes Historical Provider, MD   isosorbide mononitrate (IMDUR) 30 MG extended release tablet TAKE 1 TABLET DAILY 11/30/16  Yes Phoenix Mcgrath MD   ZETIA 10 MG tablet 1 tablet daily 5/6/16  Yes Historical Provider, MD   aspirin 81 MG tablet Take 162 mg by mouth daily    Yes Historical Provider, MD   busPIRone (BUSPAR) 5 MG tablet TAKE 1 TABLET TWICE A DAY 1/12/17   DO Prado CNP   NITROSTAT 0.4 MG SL tablet  10/7/15   Historical Provider, MD        Allergies:     Cephalexin; Erythromycin; Influenza vac typ a&b surf ant; Iodides; Pcn [penicillins]; and Sulfa antibiotics    Social History:     Tobacco:    reports that she has never smoked. She has never used smokeless tobacco.  Alcohol:      reports that she does not drink alcohol. Drug Use:  reports that she does not use drugs.     Family History:     Family History   Problem Relation Age of Onset    Heart Disease Mother    [facility] High Blood Pressure Mother     Heart Disease Father     Other Maternal Uncle         alfonso's    Other Maternal Cousin 79        alzivette       Review of Systems:     Unable to obtain as patient is non verbal.     Physical Exam:   BP (!) 157/89   Pulse 84   Temp 96.4 °F (35.8 °C) (Axillary)   Resp 15   Ht 5' (1.524 m)   Wt 110 lb 0.2 oz (49.9 kg)   SpO2 98%   BMI 21.48 kg/m²   Temp (24hrs), Av.4 °F (36.3 °C), Min:96.4 °F (35.8 °C), Max:98.1 °F (36.7 °C)        General examination:      General Appearance:  alert, well appearing, and in no acute distress  HEENT: Normocephalic, atraumatic, moist mucus membranes  Neck: supple, no carotid bruits, (-) nuchal rigidity  Lungs:  Respirations unlabored, chest wall no deformity, BS normal  Cardiovascular: normal rate, regular rhythm  Abdomen: Soft, nontender, nondistended, normal bowel sounds  Skin: No gross lesions, rashes, bruising or bleeding on exposed skin area  Extremities:  peripheral pulses palpable, no cyanosis, clubbing or edema  Psych: normal affect      Neurological examination:    Mental status   Awake, alert. Non verbal. following simple commands. Calm . Cranial nerves   II - visual fields intact to confrontation; pupils reactive  III, IV, VI - extraocular muscles intact; no TOBIN; no nystagmus; no ptosis   V - normal facial sensation                                                               VII - normal facial symmetry                                                             VIII - intact hearing                                                                             IX, X - symmetrical palate elevation                                               XI - symmetrical shoulder shrug                                                       XII - midline tongue without atrophy or fasciculation     Motor function  Strength: 5/5 RUE, 5/5 RLE, 5/5 LUE, 5/5  LLE  Normal bulk and tone.    No tremors                      Sensory function Intact to touch, pin, vibration, proprioception throughout     Cerebellar Intact finger-nose-finger testing. Intact heel-shin testing. No dysdiadochokinesia present. Reflex function 2/4 symmetric throughout . Downgoing plantar response bilaterally. (-)Ariza's sign bilaterally    Gait                  Normal gait              Diagnostics:      Laboratory Testing:  CBC:   Recent Labs     05/03/19  1637   WBC 6.5   HGB 14.1        BMP:    Recent Labs     05/03/19  1637      K 4.2   CL 97*   CO2 29   BUN 15   CREATININE 0.68  1.07   GLUCOSE 117*         Lab Results   Component Value Date    CHOL 241 (H) 12/10/2018    LDLCHOLESTEROL 152 (H) 12/10/2018    HDL 53 12/10/2018    TRIG 179 (H) 12/10/2018    ALT 29 05/03/2019    AST 29 05/03/2019    TSH 3.75 12/10/2018    INR 1.1 05/03/2019    [id number] 557 02/21/2017       Imaging/Diagnostics:        CT head - no acute process. Marked diffuse cerebral atrophy. CTA head and neck - no flow-limiting stenosis or branch occlusion. All of the above medications, clinical laboratory, imaging and other diagnostic tests were reviewed by myself. Impression:      1. Episode of loss of consciousness, suspect vasovagal syncope. Doubt focal seizure, but will rule out. 2. Alzheimer's dementia with marked cerebral atrophy on CT head. Non verbal at baseline. No behavioral disturbances. 3. Hx of CAD, HTN    Plan:   - Check EEG to r/o seizure. No AED unless EEG abnormal. Will likely be Monday.  states pt has difficulty leaving the house so would prefer to get EEG done during this hospital stay even if she will have to wait until Monday. - Check orthostatic VS  - Maintain Namenda  - Discussed with . Thank you for this very interesting consultation.       Electronically signed by Russell Bernstein DO on 5/4/2019 at 11:34 AM      Russell Bernstein [address] [facility]  Neurology

## 2019-05-04 NOTE — PLAN OF CARE
Problem: Falls - Risk of:  Goal: Will remain free from falls  Description  Will remain free from falls  Outcome: Ongoing  Note:   Patient with advanced dementia. Nonverbal.  Follows only a few commands. Telesitter in room. Bed alarm on. Goal: Absence of physical injury  Description  Absence of physical injury  Outcome: Ongoing     Problem: Risk for Impaired Skin Integrity  Goal: Tissue integrity - skin and mucous membranes  Description  Structural intactness and normal physiological function of skin and  mucous membranes. Outcome: Ongoing  Note:   Patient incontinent. Pure Wick placed Skin intact. Problem: HEMODYNAMIC STATUS  Goal: Patient has stable vital signs and fluid balance  Outcome: Ongoing  Note:   VS stable Telemetry NSR. Problem: ACTIVITY INTOLERANCE/IMPAIRED MOBILITY  Goal: Mobility/activity is maintained at optimum level for patient  Outcome: Ongoing  Note:   Patient unable to assist with NIHSS stroke scale. Moving all extremties with out difficulty. Took applesauce with any swallowing problems. Problem: COMMUNICATION IMPAIRMENT  Goal: Ability to express needs and understand communication  Outcome: Ongoing  Note:   Non verbal from dementia.

## 2019-05-04 NOTE — ED NOTES
Pt did swallow about 2 oz of water through a straw. Pt did swallow it after 10 seconds. No signs of choking noted.        Arjun Snow RN  05/03/19 2001

## 2019-05-04 NOTE — H&P
Family Medicine Admit Note    PCP: Jeannine Salinas MD    Date of Admission: 5/3/2019    Date of Service: Pt seen/examined on 5/4/19 and Admitted to Observation. Chief Complaint:  CVA      History Of Present Illness: The patient is a 77 y.o. female who presents to [facility] with complaints of possible stroke. She is well known to me and has advancing dementia. She does not speak and is unable to give history. History per chart and nursing. She had a witnessed syncopal episode yesterday where she lost consciousness for several minutes. It was witnessed by her daughter. A stroke alert was called and Dr. Ana García recommended a CT brain & CTA head & neck. No family at bedside.     Past Medical History:        Diagnosis Date    CAD (coronary artery disease)     Cataracts, bilateral     Colon polyps 08/2017    Dementia     Gastric ulcer 08/2017    GERD (gastroesophageal reflux disease) 12/16/2013    Hiatal hernia     History of colon polyps 08/14/2017    Hyperlipidemia 6/13/2013    Hypertension     Hypothyroidism (acquired) 3/25/2017    Laryngitis     MI ([facility] infarction) ([facility] [address]     Tubular adenoma of colon 10/19/2017    Villous adenoma of colon 08/2017       Past Surgical History:        Procedure Laterality Date    APPENDECTOMY      COLONOSCOPY  2009    COLONOSCOPY  08/14/2017    2.5 cm leion anorectal area-pathology-multiple fragments of villous and villotubular adenomas (most of the polyps removed), right colon--tubular adenoma    CORONARY ANGIOPLASTY WITH STENT PLACEMENT  01/28/1999    JOINT REPLACEMENT Right dec 2014    right shoulder    JOINT REPLACEMENT Left 2012    left knee    OTHER SURGICAL HISTORY      botox injections to vocal cords    OTHER SURGICAL HISTORY Right 03/19/15    I & D rt abd abscess    SIGMOIDOSCOPY  10/09/2017    Minimal probable hypertrophied mucosa at the previous polypectomy site, pathology tubular adenoma    TONSILLECTOMY      UPPER GASTROINTESTINAL ENDOSCOPY  10/09/2017    ulcer is healed, patient has significant esophagitis    UPPER GASTROINTESTINAL ENDOSCOPY  08/14/2017    ULCER AT GE JUNCTION, 2-3 CM LONG HIATAL HERNIA       Medications Prior to Admission:    Prior to Admission medications    Medication Sig Start Date End Date Taking? Authorizing Provider   lisinopril (PRINIVIL;ZESTRIL) 20 MG tablet TAKE 1 TABLET DAILY 2/25/19  Yes DO Ball CNP   levothyroxine (SYNTHROID) 25 MCG tablet TAKE 1 TABLET DAILY 2/13/19  Yes DO Ball CNP   traZODone (DESYREL) 50 MG tablet TAKE 1 TABLET EVERY NIGHT AT BEDTIME 2/7/19  Yes DO Ball CNP   amLODIPine (NORVASC) 5 MG tablet TAKE 1 TABLET DAILY 1/29/19  Yes Shelby Pulido MD   omeprazole (PRILOSEC) 40 MG delayed release capsule TAKE 1 CAPSULE DAILY 11/28/18  Yes Gifty Brooks MD   Nutritional Supplements (ENSURE HIGH PROTEIN) LIQD Take 1 Can by mouth 3 times daily (with meals) 6/4/18  Yes Shelby Pulido MD   memantine (NAMENDA) 10 MG tablet Take 10 mg by mouth 2 times daily   Yes Historical Provider, MD   Multiple Vitamins-Minerals (MULTIVITAMIN WOMEN 50+ PO) Take 1 tablet by mouth daily   Yes Historical Provider, MD   isosorbide mononitrate (IMDUR) 30 MG extended release tablet TAKE 1 TABLET DAILY 11/30/16  Yes Shelby Pulido MD   ZETIA 10 MG tablet 1 tablet daily 5/6/16  Yes Historical Provider, MD   aspirin 81 MG tablet Take 162 mg by mouth daily    Yes Historical Provider, MD   busPIRone (BUSPAR) 5 MG tablet TAKE 1 TABLET TWICE A DAY 1/12/17   DO Palmer CNP   NITROSTAT 0.4 MG SL tablet  10/7/15   Historical Provider, MD       Allergies:  Cephalexin; Erythromycin; Influenza vac typ a&b surf ant; Iodides; Pcn [penicillins]; and Sulfa antibiotics    Social History:  The patient currently lives at home with family    TOBACCO:   reports that she has never smoked.  She has never used smokeless tobacco.  ETOH:   reports that she does not drink per unit routine    Notify physician    Notify physician    Up with assistance    Neuro checks    Elevate heels off of bed at all times if patient is not able to move lower extremities    Turn or assist with turn every 2 hours if patient is unable to turn self. Remind patient to turn if necessary.  Inspect skin per unit guidelines    Maintain HOB at the lowest elevation consistent with medical plan of care    Orthostatic blood pressure and pulse    DNR comfort care    Inpatient consult to [facility]    Inpatient consult to Neurology    Inpatient consult to Social Work    Dietary Nutrition Supplements: Standard High Calorie Oral Supplement    OT eval and treat    PT eval and treat    Creatinine W/GFR Point of Care    EKG 12 Lead    EEG awake and drowsy    Insert peripheral IV    PATIENT STATUS (DIRECT) Observation    PATIENT STATUS (FROM ED OR OR/PROCEDURAL) Observation    DME Order for Wheel Chair as OP         DVT Prophylaxis:   Diet: DIET CARDIAC;  Dysphagia III Advanced  Code Status: DNR-CC      Dispo - admitted       @DFW@

## 2019-05-04 NOTE — ED NOTES
Dr Snow Doctor spoke with pt's daughter, Giuliana Foy, about DNR options. Pt's daughter did decide to make pt a [facility]. Dr Gwendolyn Denny did sign paperwork.        Sunny Eduardo RN  05/03/19 1306

## 2019-05-05 PROBLEM — N30.00 ACUTE CYSTITIS WITHOUT HEMATURIA: Status: ACTIVE | Noted: 2019-05-05

## 2019-05-05 PROCEDURE — 99224 PR SBSQ OBSERVATION CARE/DAY 15 MINUTES: CPT | Performed by: FAMILY MEDICINE

## 2019-05-05 PROCEDURE — 96365 THER/PROPH/DIAG IV INF INIT: CPT

## 2019-05-05 PROCEDURE — 6360000002 HC RX W HCPCS: Performed by: FAMILY MEDICINE

## 2019-05-05 PROCEDURE — 96372 THER/PROPH/DIAG INJ SC/IM: CPT

## 2019-05-05 PROCEDURE — 99225 PR SBSQ OBSERVATION CARE/DAY 25 MINUTES: CPT | Performed by: PSYCHIATRY & NEUROLOGY

## 2019-05-05 PROCEDURE — G0378 HOSPITAL OBSERVATION PER HR: HCPCS

## 2019-05-05 PROCEDURE — 97116 GAIT TRAINING THERAPY: CPT

## 2019-05-05 PROCEDURE — 6370000000 HC RX 637 (ALT 250 FOR IP): Performed by: FAMILY MEDICINE

## 2019-05-05 PROCEDURE — 2580000003 HC RX 258: Performed by: FAMILY MEDICINE

## 2019-05-05 RX ORDER — CIPROFLOXACIN 2 MG/ML
400 INJECTION, SOLUTION INTRAVENOUS EVERY 12 HOURS
Status: DISCONTINUED | OUTPATIENT
Start: 2019-05-05 | End: 2019-05-06 | Stop reason: HOSPADM

## 2019-05-05 RX ADMIN — MULTIPLE VITAMINS W/ MINERALS TAB 1 TABLET: TAB at 09:32

## 2019-05-05 RX ADMIN — ASPIRIN 162 MG: 81 TABLET, COATED ORAL at 09:32

## 2019-05-05 RX ADMIN — LEVOTHYROXINE SODIUM 25 MCG: 0.03 TABLET ORAL at 07:00

## 2019-05-05 RX ADMIN — MEMANTINE HYDROCHLORIDE 10 MG: 10 TABLET ORAL at 20:34

## 2019-05-05 RX ADMIN — AMLODIPINE BESYLATE 5 MG: 5 TABLET ORAL at 12:51

## 2019-05-05 RX ADMIN — TRAZODONE HYDROCHLORIDE 50 MG: 50 TABLET ORAL at 20:36

## 2019-05-05 RX ADMIN — Medication 10 ML: at 20:36

## 2019-05-05 RX ADMIN — MEMANTINE HYDROCHLORIDE 10 MG: 10 TABLET ORAL at 09:33

## 2019-05-05 RX ADMIN — ENOXAPARIN SODIUM 40 MG: 100 INJECTION SUBCUTANEOUS at 09:34

## 2019-05-05 RX ADMIN — LISINOPRIL 20 MG: 20 TABLET ORAL at 12:51

## 2019-05-05 RX ADMIN — OMEPRAZOLE 40 MG: 40 CAPSULE, DELAYED RELEASE ORAL at 07:01

## 2019-05-05 RX ADMIN — ISOSORBIDE MONONITRATE 30 MG: 30 TABLET, EXTENDED RELEASE ORAL at 12:51

## 2019-05-05 RX ADMIN — CIPROFLOXACIN 400 MG: 2 INJECTION, SOLUTION INTRAVENOUS at 15:38

## 2019-05-05 RX ADMIN — Medication 10 ML: at 09:33

## 2019-05-05 ASSESSMENT — PAIN SCALES - WONG BAKER

## 2019-05-05 NOTE — PROGRESS NOTES
FAMILY MEDICINE  - PROGRESS NOTE    Date:  5/5/2019  Ivan Diana  [id number]      Chief Complaint   Patient presents with    Cerebrovascular Accident     stroke alert         Interval History:  not changed, she will have an EEG tomorrow. She is awake & alert. No family at bedside.       Subjective  Eyes: positive for contacts/glasses  Gastrointestinal: positive for reflux symptoms  Neurological: positive for memory problems  Endocrine: positive for hypothyroid:    Objective:    BP (!) 152/89   Pulse 100   Temp 97.6 °F (36.4 °C) (Oral)   Resp 16   Ht 5' (1.524 m)   Wt 110 lb 1 oz (49.9 kg)   SpO2 95%   BMI 21.50 kg/m²   General appearance - alert, well appearing, and in no distress  Mental status - inappropriate safety awareness  Eyes - pupils equal and reactive, extraocular eye movements intact  Ears - hearing grossly normal bilaterally  Nose - normal and patent, no erythema, discharge or polyps  Mouth - mucous membranes moist, pharynx normal without lesions  Neck - supple, no significant adenopathy  Lymphatics - no palpable lymphadenopathy, no hepatosplenomegaly  Chest - clear to auscultation, no wheezes, rales or rhonchi, symmetric air entry  Heart - normal rate, regular rhythm, normal S1, S2, no murmurs, rubs, clicks or gallops  Abdomen - soft, nontender, nondistended, no masses or organomegaly  Breasts - not examined  Back exam - full range of motion, no tenderness, palpable spasm or pain on motion  Neurological - neck supple without rigidity, abnormal neurological exam unchanged from prior examinations  Musculoskeletal - osteoarthritic changes noted in both hands  Extremities - peripheral pulses normal, no pedal edema, no clubbing or cyanosis  Skin - normal coloration and turgor, no rashes, no suspicious skin lesions noted    Data:   Medications:   Current Facility-Administered Medications   Medication Dose Route Frequency Provider Last Rate Last Dose    ciprofloxacin (CIPRO) IVPB 400 mg  400 mg Intravenous Q12H Susie Rain  mL/hr at 05/05/19 1538 400 mg at 05/05/19 1538    amLODIPine (NORVASC) tablet 5 mg  5 mg Oral Daily Susie Rain MD   5 mg at 05/05/19 1251    isosorbide mononitrate (IMDUR) extended release tablet 30 mg  30 mg Oral Daily Susie Rain MD   30 mg at 05/05/19 1251    memantine (NAMENDA) tablet 10 mg  10 mg Oral BID Susie Rain MD   10 mg at 05/05/19 0933    omeprazole (PRILOSEC) delayed release capsule 40 mg  40 mg Oral Daily Susie Rain MD   40 mg at 05/05/19 0701    lisinopril (PRINIVIL;ZESTRIL) tablet 20 mg  20 mg Oral Daily Susie Rain MD   20 mg at 05/05/19 1251    levothyroxine (SYNTHROID) tablet 25 mcg  25 mcg Oral Daily Susie Rain MD   25 mcg at 05/05/19 0700    traZODone (DESYREL) tablet 50 mg  50 mg Oral Nightly Susie Rain MD   50 mg at 05/04/19 2048    sodium chloride flush 0.9 % injection 10 mL  10 mL Intravenous 2 times per day Susie Rain MD   10 mL at 05/05/19 0933    sodium chloride flush 0.9 % injection 10 mL  10 mL Intravenous PRN Susie Rain MD        acetaminophen (TYLENOL) tablet 650 mg  650 mg Oral Q4H PRN Susie Rain MD        enoxaparin (LOVENOX) injection 40 mg  40 mg Subcutaneous Daily Susie Rain MD   40 mg at 05/05/19 0934    aspirin EC tablet 162 mg  162 mg Oral Daily Susie Rain MD   162 mg at 05/05/19 0932    nitroGLYCERIN (NITROSTAT) SL tablet 0.4 mg  0.4 mg Sublingual Q5 Min PRN Susie Rain MD        therapeutic multivitamin-minerals 1 tablet  1 tablet Oral Daily Susie Rain MD   1 tablet at 05/05/19 0932       Intake/Output Summary (Last 24 hours) at 5/5/2019 1541  Last data filed at 5/5/2019 0939  Gross per 24 hour   Intake 300 ml   Output --   Net 300 ml     No results found for this or any previous visit (from the past 24 hour(s)).  -----------------------------------------------------------------  RAD:  EKG:  Micro:     Assessment & Plan:    Patient Active Problem List:     Hypertension     Hyperlipidemia     Verruca vulgaris     GERD (gastroesophageal reflux disease)     Open wound of abdomen     Hypothyroidism (acquired)     Late onset Alzheimer's disease without behavioral disturbance     Occult blood in stools     Diarrhea     History of colon polyps     Hiatal hernia     Dementia     Colon polyps     Tubular adenoma of colon     Esophagitis     Villous adenoma of colon     Moderate protein-calorie malnutrition (HCC)     TIA (transient ischemic attack)     Severe malnutrition (HCC)     Syncope and collapse           Plan:  TIA - sx's improved, Neurology manging & EEG scheduled for tomorrow. UTI - IV Cipro started. Continue current treatments. Complete orders per chart.     See orders   Disposition:    Electronically signed by Ella Hastings MD on 5/5/2019 at 3:41 PM

## 2019-05-05 NOTE — PLAN OF CARE
Problem: Falls - Risk of:  Goal: Will remain free from falls  Description  Will remain free from falls  Outcome: Met This Shift  Goal: Absence of physical injury  Description  Absence of physical injury  Outcome: Met This Shift     Problem: Risk for Impaired Skin Integrity  Goal: Tissue integrity - skin and mucous membranes  Description  Structural intactness and normal physiological function of skin and  mucous membranes.   Outcome: Met This Shift     Problem: ACTIVITY INTOLERANCE/IMPAIRED MOBILITY  Goal: Mobility/activity is maintained at optimum level for patient  Outcome: Met This Shift

## 2019-05-05 NOTE — CARE COORDINATION
DISCHARGE PLANNING NOTE:    Plan is for this patient to return to home with spouse. PT recs home with 24 hour supervision which the patient does have as spouse is caregiver. Requesting [facility] services - List given and chose [facility]. Also requesting wheelchair - script sent to [facility] and awaiting F2F to be cosigned by Dr. Aneta Porter. Viola Header - 11%. Will continue to follow along.      Electronically signed by Prachi Lawton RN on 5/5/2019 at 1:34 PM

## 2019-05-05 NOTE — PROGRESS NOTES
[facility] Neurology   IN-PATIENT SERVICE      NEUROLOGY PROGRESS  NOTE            Date:   5/5/2019  Patient name:  My Quiroz  Date of admission:  5/3/2019  YOB: 1950      Interval History:   My Quiroz is a  77 y.o. female admitted on 5/3/2019 with TIA (transient ischemic attack) [G45.9]  TIA (transient ischemic attack) [G45.9]. This is a follow-up neurology progress note. The patient was seen and examined and the chart was reviewed.  at bedside. States she is not as cooperative today. She is looking to opposite side of bed and not wanting to listen to him. No further syncopal, or pre-syncopal events. -170s. Urine culture ,000 E. Coli. EEG pending. History of Present Illness: The patient is a 77 y.o. female who presents with Cerebrovascular Accident (stroke alert)   and she is admitted to the hospital for the management of  TIA/syncope. The patient was seen and examined and the chart was reviewed.  at bedside providing history. Patient has hx of advanced dementia diagnosed about 4 years ago, following with Dr. Norma Thrasher. She is non verbal at baseline and lives at home with her . She is still able to ambulate and follows most commands. She is on a pureed type diet and he is constantly trying to get her to eat and drink but is not sure if she is taking in as much as she needs to. States she is ambulating 20,000 steps per day so burns a lot of what she eats. He denies any behavioral disturbances of dementia and states she is very pleasant to be around. On Namenda 10 mg BID.     Patient had episode yesterday in which she was sitting on a chair and passed out with eyes rolling back and noted to have flaccid right extremities. Deficits improved rapidly, lasting about 8 minutes or so. No evidence of jerking, or convulsing type movements. No foaming at the mouth or tongue biting.   did state she was wet though as if she had bladder incontinence when she came in. Dr. Garfield Cobian contacted over [facility] and CT head with no acute process, significant atrophy. No TPA decided to be given due to rapid resolution of symptoms. BP was in the 140 range on arrival.  states she has had an episode similar to this back in July, 2018 and she was hospitalized. At the time they were outside taking a picture together with family and he told her to turn around and next thing he knew she passed out. That episode only lasted for one minute or so. No witnessed seizure like activity that time either. Patient admitted for syncopal work up. At this time she appears back at her normal baseline which is lying in bed calmly, comfortably and non verbal per the . Nurse states she was able to ambulate up and down the hallway without any issues today. She is eating lunch at this time with the help of her . Hx CAD, HTN. On  mg daily.      Past Medical History:     Past Medical History:   Diagnosis Date    CAD (coronary artery disease)     Cataracts, bilateral     Colon polyps 08/2017    Dementia     Gastric ulcer 08/2017    GERD (gastroesophageal reflux disease) 12/16/2013    Hiatal hernia     History of colon polyps 08/14/2017    Hyperlipidemia 6/13/2013    Hypertension     Hypothyroidism (acquired) 3/25/2017    Laryngitis     MI (myocardial infarction) ([facility] [address]     Tubular adenoma of colon 10/19/2017    Villous adenoma of colon 08/2017        Past Surgical History:     Past Surgical History:   Procedure Laterality Date    APPENDECTOMY      COLONOSCOPY  2009    COLONOSCOPY  08/14/2017    2.5 cm leion anorectal area-pathology-multiple fragments of villous and villotubular adenomas (most of the polyps removed), right colon--tubular adenoma    CORONARY ANGIOPLASTY WITH STENT PLACEMENT  01/28/1999    JOINT REPLACEMENT Right dec 2014    right shoulder    JOINT REPLACEMENT Left 2012    left knee    OTHER SURGICAL HISTORY      botox injections to vocal cords    OTHER SURGICAL HISTORY Right 03/19/15    I & D rt abd abscess    SIGMOIDOSCOPY  10/09/2017    Minimal probable hypertrophied mucosa at the previous polypectomy site, pathology tubular adenoma    TONSILLECTOMY      UPPER GASTROINTESTINAL ENDOSCOPY  10/09/2017    ulcer is healed, patient has significant esophagitis    UPPER GASTROINTESTINAL ENDOSCOPY  2017    ULCER AT GE JUNCTION, 2-3 CM LONG HIATAL HERNIA        Medications during admission:      amLODIPine  5 mg Oral Daily    isosorbide mononitrate  30 mg Oral Daily    memantine  10 mg Oral BID    omeprazole  40 mg Oral Daily    lisinopril  20 mg Oral Daily    levothyroxine  25 mcg Oral Daily    traZODone  50 mg Oral Nightly    sodium chloride flush  10 mL Intravenous 2 times per day    enoxaparin  40 mg Subcutaneous Daily    aspirin  162 mg Oral Daily    therapeutic multivitamin-minerals  1 tablet Oral Daily         Physical Exam:   BP (!) 152/89   Pulse 100   Temp 97.6 °F (36.4 °C) (Oral)   Resp 16   Ht 5' (1.524 m)   Wt 110 lb 1 oz (49.9 kg)   SpO2 95%   BMI 21.50 kg/m²   Temp (24hrs), Av °F (36.7 °C), Min:96.9 °F (36.1 °C), Max:98.6 °F (37 °C)          Neurological examination:    Mental status    Awake, alert. Non verbal. following simple commands. Calm . Cranial nerves    II - visual fields intact to confrontation; pupils reactive  III, IV, VI - extraocular muscles intact; no TOBIN; no nystagmus; no ptosis   V - normal facial sensation                                                               VII - normal facial symmetry                                                             VIII - intact hearing      Motor function  Strength: moving all 4 extremities symmetrically. Normal bulk and tone. No tremors                       Sensory function Intact to touch throughout      Cerebellar Did not assess       Reflex function 2/4 symmetric throughout .       Gait                  Not assessed.     Diagnostics:      Laboratory Testing:  CBC:   Recent Labs     05/03/19  1637   WBC 6.5   HGB 14.1        BMP:    Recent Labs     05/03/19  1637      K 4.2   CL 97*   CO2 29   BUN 15   CREATININE 0.68  1.07   GLUCOSE 117*         Lab Results   Component Value Date    CHOL 241 (H) 12/10/2018    LDLCHOLESTEROL 152 (H) 12/10/2018    HDL 53 12/10/2018    TRIG 179 (H) 12/10/2018    ALT 29 05/03/2019    AST 29 05/03/2019    TSH 3.75 12/10/2018    INR 1.1 05/03/2019    [id number] 557 02/21/2017         Imaging/Diagnostics:      EEG: pending      CT head - no acute process. Marked diffuse cerebral atrophy.      CTA head and neck - no flow-limiting stenosis or branch occlusion. All of the above medications, clinical laboratory, imaging and other diagnostic tests were reviewed by myself. Impression:      1. Episode of loss of consciousness, suspect vasovagal syncope. Doubt focal seizure, but will rule out. 2. Alzheimer's dementia with marked cerebral atrophy on CT head. Non verbal at baseline. No behavioral disturbances. 3. E. Coli UTI    4. Hx of CAD, HTN    Plan:   - Check EEG to r/o seizure. No AED unless EEG abnormal. Will likely be Monday.  states pt has difficulty leaving the house so would prefer to get EEG done during this hospital stay even if she will have to wait until Monday. - Check orthostatic VS  - Maintain Namenda  - ABx for UTI per primary   - Discussed with .        Electronically signed by Zeferino Matos DO on 5/5/2019 at 1:44 PM      Zeferino Matos [address] [place] [address]  Neurology

## 2019-05-05 NOTE — CARE COORDINATION
DISCHARGE PLANNING NOTE:    F2F for wheelchair was faxed to [facility] at [phone].      Electronically signed by Nichole Knutson RN on 5/5/2019 at 4:06 PM

## 2019-05-05 NOTE — PROGRESS NOTES
Physical Therapy  Facility/Department: [address] [place] [address] PROGRESSIVE CARE  Daily Treatment Note  NAME: My Quiroz  : 1950  MRN: [id number]    Date of Service: 2019    Discharge Recommendations:  24 hour supervision or assist   PT Equipment Recommendations  Equipment Needed: No    Patient Diagnosis(es): The primary encounter diagnosis was Syncope and collapse. Diagnoses of TIA (transient ischemic attack), Late onset Alzheimer's disease without behavioral disturbance, and Severe malnutrition ([facility] [address] were also pertinent to this visit. has a past medical history of CAD (coronary artery disease), Cataracts, bilateral, Colon polyps, Dementia, Gastric ulcer, GERD (gastroesophageal reflux disease), Hiatal hernia, History of colon polyps, Hyperlipidemia, Hypertension, Hypothyroidism (acquired), Laryngitis, MI (myocardial infarction) ([facility] [address] Tubular adenoma of colon, and Villous adenoma of colon. has a past surgical history that includes Appendectomy; joint replacement (Right, dec 2014); joint replacement (Left, ); Tonsillectomy; Colonoscopy (); Upper gastrointestinal endoscopy (10/09/2017); other surgical history; other surgical history (Right, 03/19/15); Coronary angioplasty with stent (1999); Colonoscopy (2017); Upper gastrointestinal endoscopy (2017); and Sigmoidoscopy (10/09/2017). Restrictions  Restrictions/Precautions  Restrictions/Precautions: Fall Risk  Position Activity Restriction  Other position/activity restrictions: Pt is aphasic at baseline, severe demantia.   Subjective   Subjective  Subjective: no complains of pain, present  General Comment  Comments: will have EEG tomorrow and possibly will be discharged per   Pain Screening  Patient Currently in Pain: No  Vital Signs  Patient Currently in Pain: No       Orientation  Orientation  Overall Orientation Status: Impaired  Orientation Level: Disoriented X4  Cognition    limited due to dementia  Objective   Bed

## 2019-05-05 NOTE — FLOWSHEET NOTE
05/05/19 1134   Encounter Summary   Services provided to: Patient   Referral/Consult From: Darren   Continue Visiting   (5/5/19)   Complexity of Encounter Low   Length of Encounter 15 minutes   Routine   Type Follow up   Spiritual/Sikhism   Type Ritual   Intervention Anointing   Sacraments   Sacrament of Sick-Anointing Anointed  (Darrin Chanel 5/5/19)

## 2019-05-05 NOTE — PLAN OF CARE
Problem: Falls - Risk of:  Goal: Will remain free from falls  Description  Will remain free from falls  Outcome: Met This Shift  Goal: Absence of physical injury  Description  Absence of physical injury  Outcome: Met This Shift     Problem: Risk for Impaired Skin Integrity  Goal: Tissue integrity - skin and mucous membranes  Description  Structural intactness and normal physiological function of skin and  mucous membranes.   Outcome: Met This Shift     Problem: HEMODYNAMIC STATUS  Goal: Patient has stable vital signs and fluid balance  Outcome: Met This Shift     Problem: ACTIVITY INTOLERANCE/IMPAIRED MOBILITY  Goal: Mobility/activity is maintained at optimum level for patient  Outcome: Met This Shift     Problem: COMMUNICATION IMPAIRMENT  Goal: Ability to express needs and understand communication  Outcome: Met This Shift     Problem: Nutrition  Goal: Optimal nutrition therapy  5/4/2019 2230 by Nevin Munroe RN  Outcome: Met This Shift  5/4/2019 1519 by Reed Feliz RD, LD  Outcome: Ongoing

## 2019-05-05 NOTE — PROGRESS NOTES
Cardiac monitor shows tachycardia 140\"s while pt is ambulating in hallway with PT and ;strip posted; asymtomatic to tachycardia; was returned to bed

## 2019-05-06 VITALS
OXYGEN SATURATION: 98 % | WEIGHT: 110.06 LBS | BODY MASS INDEX: 21.61 KG/M2 | TEMPERATURE: 98.2 F | SYSTOLIC BLOOD PRESSURE: 124 MMHG | DIASTOLIC BLOOD PRESSURE: 73 MMHG | RESPIRATION RATE: 12 BRPM | HEART RATE: 126 BPM | HEIGHT: 60 IN

## 2019-05-06 LAB
CULTURE: ABNORMAL
GLUCOSE BLD-MCNC: 114 MG/DL (ref 65–105)
Lab: ABNORMAL
SPECIMEN DESCRIPTION: ABNORMAL

## 2019-05-06 PROCEDURE — 6360000002 HC RX W HCPCS: Performed by: FAMILY MEDICINE

## 2019-05-06 PROCEDURE — 97116 GAIT TRAINING THERAPY: CPT

## 2019-05-06 PROCEDURE — 96372 THER/PROPH/DIAG INJ SC/IM: CPT

## 2019-05-06 PROCEDURE — 82947 ASSAY GLUCOSE BLOOD QUANT: CPT

## 2019-05-06 PROCEDURE — 97530 THERAPEUTIC ACTIVITIES: CPT

## 2019-05-06 PROCEDURE — 95819 EEG AWAKE AND ASLEEP: CPT | Performed by: PSYCHIATRY & NEUROLOGY

## 2019-05-06 PROCEDURE — 6370000000 HC RX 637 (ALT 250 FOR IP): Performed by: FAMILY MEDICINE

## 2019-05-06 PROCEDURE — G0378 HOSPITAL OBSERVATION PER HR: HCPCS

## 2019-05-06 PROCEDURE — 99217 PR OBSERVATION CARE DISCHARGE MANAGEMENT: CPT | Performed by: FAMILY MEDICINE

## 2019-05-06 PROCEDURE — 2580000003 HC RX 258: Performed by: FAMILY MEDICINE

## 2019-05-06 PROCEDURE — 95819 EEG AWAKE AND ASLEEP: CPT

## 2019-05-06 PROCEDURE — 99225 PR SBSQ OBSERVATION CARE/DAY 25 MINUTES: CPT | Performed by: PSYCHIATRY & NEUROLOGY

## 2019-05-06 PROCEDURE — 96366 THER/PROPH/DIAG IV INF ADDON: CPT

## 2019-05-06 PROCEDURE — 97166 OT EVAL MOD COMPLEX 45 MIN: CPT

## 2019-05-06 RX ORDER — CIPROFLOXACIN 500 MG/1
500 TABLET, FILM COATED ORAL 2 TIMES DAILY
Qty: 14 TABLET | Refills: 0 | Status: SHIPPED | OUTPATIENT
Start: 2019-05-06 | End: 2019-05-13

## 2019-05-06 RX ADMIN — ASPIRIN 162 MG: 81 TABLET, COATED ORAL at 11:19

## 2019-05-06 RX ADMIN — LEVOTHYROXINE SODIUM 25 MCG: 0.03 TABLET ORAL at 06:10

## 2019-05-06 RX ADMIN — Medication 10 ML: at 11:20

## 2019-05-06 RX ADMIN — MULTIPLE VITAMINS W/ MINERALS TAB 1 TABLET: TAB at 11:19

## 2019-05-06 RX ADMIN — AMLODIPINE BESYLATE 5 MG: 5 TABLET ORAL at 11:20

## 2019-05-06 RX ADMIN — OMEPRAZOLE 40 MG: 40 CAPSULE, DELAYED RELEASE ORAL at 06:10

## 2019-05-06 RX ADMIN — CIPROFLOXACIN 400 MG: 2 INJECTION, SOLUTION INTRAVENOUS at 16:25

## 2019-05-06 RX ADMIN — ENOXAPARIN SODIUM 40 MG: 100 INJECTION SUBCUTANEOUS at 11:19

## 2019-05-06 RX ADMIN — MEMANTINE HYDROCHLORIDE 10 MG: 10 TABLET ORAL at 11:20

## 2019-05-06 RX ADMIN — ISOSORBIDE MONONITRATE 30 MG: 30 TABLET, EXTENDED RELEASE ORAL at 11:20

## 2019-05-06 RX ADMIN — CIPROFLOXACIN 400 MG: 2 INJECTION, SOLUTION INTRAVENOUS at 03:51

## 2019-05-06 RX ADMIN — LISINOPRIL 20 MG: 20 TABLET ORAL at 11:20

## 2019-05-06 ASSESSMENT — PAIN SCALES - WONG BAKER

## 2019-05-06 NOTE — PROGRESS NOTES
FAMILY MEDICINE  - PROGRESS NOTE    Date:  5/6/2019  Velia Abel  [id number]      Chief Complaint   Patient presents with    Cerebrovascular Accident     stroke alert         Interval History:  Improved, she is awake & eating breakfast. She will have an EEG today.       Subjective  Constitutional: negative  Eyes: positive for contacts/glasses  Ears, nose, mouth, throat, and face: negative  Respiratory: negative  Cardiovascular: negative  Gastrointestinal: positive for reflux symptoms  Musculoskeletal:positive for myalgias and stiff joints  Neurological: positive for memory problems  Behavioral/Psych: negative  Endocrine: positive for hypothyroid:    Objective:    BP (!) 129/110   Pulse 97   Temp 97.2 °F (36.2 °C) (Oral)   Resp 20   Ht 5' (1.524 m)   Wt 110 lb 1 oz (49.9 kg)   SpO2 97%   BMI 21.50 kg/m²   General appearance - alert, well appearing, and in no distress  Mental status - inappropriate safety awareness  Eyes - pupils equal and reactive, extraocular eye movements intact  Ears - bilateral TM's and external ear canals normal  Nose - normal and patent, no erythema, discharge or polyps  Mouth - mucous membranes moist, pharynx normal without lesions  Neck - supple, no significant adenopathy  Lymphatics - no palpable lymphadenopathy, no hepatosplenomegaly  Chest - clear to auscultation, no wheezes, rales or rhonchi, symmetric air entry  Heart - normal rate, regular rhythm, normal S1, S2, no murmurs, rubs, clicks or gallops  Abdomen - soft, nontender, nondistended, no masses or organomegaly  Breasts - not examined  Back exam - full range of motion, no tenderness, palpable spasm or pain on motion  Neurological - neck supple without rigidity  Musculoskeletal - osteoarthritic changes noted in both hands  Extremities - peripheral pulses normal, no pedal edema, no clubbing or cyanosis  Skin - normal coloration and turgor, no rashes, no suspicious skin lesions noted    Data:   Medications:   Current Facility-Administered Medications   Medication Dose Route Frequency Provider Last Rate Last Dose    ciprofloxacin (CIPRO) IVPB 400 mg  400 mg Intravenous Q12H Vanessa Mckinney  mL/hr at 05/06/19 0351 400 mg at 05/06/19 0351    amLODIPine (NORVASC) tablet 5 mg  5 mg Oral Daily Vanessa Mckinney MD   5 mg at 05/05/19 1251    isosorbide mononitrate (IMDUR) extended release tablet 30 mg  30 mg Oral Daily Vanessa Mckinney MD   30 mg at 05/05/19 1251    memantine (NAMENDA) tablet 10 mg  10 mg Oral BID Vanessa Mckinney MD   10 mg at 05/05/19 2034    omeprazole (PRILOSEC) delayed release capsule 40 mg  40 mg Oral Daily Vanessa Mckinney MD   40 mg at 05/05/19 0701    lisinopril (PRINIVIL;ZESTRIL) tablet 20 mg  20 mg Oral Daily Vanessa Mckinney MD   20 mg at 05/05/19 1251    levothyroxine (SYNTHROID) tablet 25 mcg  25 mcg Oral Daily Vanessa Mckinney MD   25 mcg at 05/05/19 0700    traZODone (DESYREL) tablet 50 mg  50 mg Oral Nightly Vanessa Mckinney MD   50 mg at 05/05/19 2036    sodium chloride flush 0.9 % injection 10 mL  10 mL Intravenous 2 times per day Vanessa Mckinney MD   10 mL at 05/05/19 2036    sodium chloride flush 0.9 % injection 10 mL  10 mL Intravenous PRN Vanessa Mckinney MD        acetaminophen (TYLENOL) tablet 650 mg  650 mg Oral Q4H PRN Vanessa Mckinney MD        enoxaparin (LOVENOX) injection 40 mg  40 mg Subcutaneous Daily Vanessa Mckinney MD   40 mg at 05/05/19 0934    aspirin EC tablet 162 mg  162 mg Oral Daily Vanessa Mckinney MD   162 mg at 05/05/19 0932    nitroGLYCERIN (NITROSTAT) SL tablet 0.4 mg  0.4 mg Sublingual Q5 Min PRN Vanessa Mckinney MD        therapeutic multivitamin-minerals 1 tablet  1 tablet Oral Daily Vanessa Mckinney MD   1 tablet at 05/05/19 0932       Intake/Output Summary (Last 24 hours) at 5/6/2019 0544  Last data filed at 5/5/2019 0939  Gross per 24 hour   Intake 200 ml   Output --   Net 200 ml     No results found for this or any previous visit (from the past 24 hour(s)).  -----------------------------------------------------------------  RAD:  EKG:  Micro:     Assessment & Plan:    Patient Active Problem List:     Hypertension     Hyperlipidemia     Verruca vulgaris     GERD (gastroesophageal reflux disease)     Open wound of abdomen     Hypothyroidism (acquired)     Late onset Alzheimer's disease without behavioral disturbance     Occult blood in stools     Diarrhea     History of colon polyps     Hiatal hernia     Dementia     Colon polyps     Tubular adenoma of colon     Esophagitis     Villous adenoma of colon     Moderate protein-calorie malnutrition (HCC)     TIA (transient ischemic attack)     Severe malnutrition (HCC)     Syncope and collapse     Acute cystitis without hematuria           Plan:  TIA with syncope - sx's improved, EEG today. UTI - on IV Cipro. Continue current treatments. Okay to D/C home with home care if okay with Neurology. Follow up in the office next week. Complete orders per chart.     See orders   Disposition:    Electronically signed by Bertha Cabrera MD on 5/6/2019 at 5:44 AM

## 2019-05-06 NOTE — PROGRESS NOTES
[address] [facility]    Occupational Therapy Evaluation  Date: 19  Patient Name: Mick William       Room: -  MRN: [id number]  Account: [de-identified]   : 1950  (77 y.o.) Gender: female     Discharge Recommendations:  24 hour supervision or assist, Home with Home health OT       Referring Practitioner: Dr. Honey Eldridge  Diagnosis: TIA       Treatment Diagnosis: Impaired self-care status  Past Medical History:  has a past medical history of CAD (coronary artery disease), Cataracts, bilateral, Colon polyps, Dementia, Gastric ulcer, GERD (gastroesophageal reflux disease), Hiatal hernia, History of colon polyps, Hyperlipidemia, Hypertension, Hypothyroidism (acquired), Laryngitis, MI (myocardial infarction) ([facility] [address] Tubular adenoma of colon, and Villous adenoma of colon. Past Surgical History:   has a past surgical history that includes Appendectomy; joint replacement (Right, dec 2014); joint replacement (Left, ); Tonsillectomy; Colonoscopy (); Upper gastrointestinal endoscopy (10/09/2017); other surgical history; other surgical history (Right, 03/19/15); Coronary angioplasty with stent (1999); Colonoscopy (2017); Upper gastrointestinal endoscopy (2017); and Sigmoidoscopy (10/09/2017). Restrictions  Restrictions/Precautions: Fall Risk  Implants present? : Metal implants(2 cardiac stents; TKA & TSA)  Other position/activity restrictions: Pt is aphasic at baseline, severe demantia. Required Braces or Orthoses?: No     Vitals  Temp: 98.2 °F (36.8 °C)  Pulse: 126  Resp: 12  BP: 124/73  Height: 5' (152.4 cm)  Weight: 110 lb 1 oz (49.9 kg)  BMI (Calculated): 21.5  Oxygen Therapy  SpO2: 98 %  O2 Device: None (Room air)  Level of Consciousness: Alert    Subjective  Subjective: Pt is nonverbal, however tracked OT with her eyes. Pt followed one-step commands 50% of the time with repetition and tactile cues.      Vision  Vision: Impaired  Vision Exceptions: Wears glasses at all times  Hearing  Hearing: Exceptions to [facility]  Hearing Exceptions: Hard of hearing/hearing concerns, No hearing aid(has not used in a while)  Social/Functional History  Lives With: Spouse  Type of Home: House  Home Layout: One level, Laundry in basement  Home Access: Stairs to enter with rails  Entrance Stairs - Number of Steps: 3 to enter from garage; 1 + 1 to enter from [address]: None  Bathroom Shower/Tub: Tub/Shower unit  Bathroom Toilet: Handicap height  Bathroom Equipment: Hand-held shower(Pt typically uses briefs)  Home Equipment: (no DME)  Receives Help From: Family  ADL Assistance: Needs assistance(assist w/ bathing, dressing, toileting; SBA to Max A feeding)  Homemaking Assistance: (spouse is primary)  Homemaking Responsibilities: No  Ambulation Assistance: Independent  Transfer Assistance: Independent  Active : No  Patient's  Info: Pt's spouse  Mode of Transportation: [facility]  Additional Comments: Pt's spouse is present 24/7 for assist. Pt is nonverbal with spouse present to provide information as needed. Pain Assessment  Pain Assessment: Advanced Dementia    Objective  Vision - Basic Assessment  Prior Vision: Wears glasses all the time  Visual History: Cataracts, Corrective eye surgery  Patient Visual Report: No visual complaint reported. Cognition  Overall Cognitive Status: Impaired  Arousal/Alertness: Delayed responses to stimuli  Memory: Unable to assess(as Pt is nonverbal)  Following Commands:  Follows one step commands with repetition(inconsistently and with tactile cues)  Safety Judgement: Decreased awareness of need for safety  Awareness of Errors: Decreased awareness of errors  Insights: Decreased awareness of deficits  Sequencing and Organization: Assistance required to implement solutions, Assistance required to generate solutions, Assistance required to identify errors made   Perception  Overall Perceptual Status: WFL  Sensation  Overall Sensation Status: (ALVIN d/t baseline dementia)   ADL  Feeding: Maximum assistance, Setup, Increased time to complete, Verbal cueing  Grooming: Maximum assistance, Setup, Verbal cueing, Increased time to complete  UE Bathing: Maximum assistance, Setup, Verbal cueing, Increased time to complete  LE Bathing: Dependent/Total  UE Dressing: Maximum assistance, Setup, Verbal cueing, Increased time to complete  LE Dressing: Dependent/Total  Toileting: Dependent/Total  Additional Comments: Pt requires increased assist due to cognitive state. Pt engaged in functional mobility in room and hallway. Per spouse, Pt is highly motivated to engage in functional mobility. Spouse reports she walks 20,000 steps with IND at home and likes to [employer]" into things. OT facilitated Pt's engagement in meaningful occupation of functional mobility this date. UE Function           LUE Strength  Gross LUE Strength: WFL  L Hand Grasp: 4+/5  LUE Strength Comment: Grossly observed WFL. Unable to formally assess d/t Pt's baseline dementia     LUE Tone: Normotonic     LUE AROM (degrees)  LUE AROM : WFL     Left Hand AROM (degrees)  Left Hand AROM: WFL  RUE Strength  Gross RUE Strength: WFL  R Hand Grasp: 4+/5  RUE Strength Comment: Grossly observed WFL. Unable to formally assess d/t Pt's baseline dementia      RUE Tone: Normotonic     RUE AROM (degrees)  RUE AROM : WFL     Right Hand AROM (degrees)  Right Hand AROM: WFL    Fine Motor Skills  Coordination  Movements Are Fluid And Coordinated: No  Coordination and Movement description: Left UE, Right UE, Decreased accuracy, Decreased speed                           Mobility  Supine to Sit: Moderate assistance  Sit to Supine:  Moderate assistance       Balance  Sitting Balance: Stand by assistance  Standing Balance: Minimal assistance(CGA/Min A)  Standing Balance  Time: 4-5 minutes  Activity: mobility in room and hallway  Functional Mobility  Functional - Mobility Device: (Hand-in-hand assist)  Activity: (in room and G-Code Modifier : CL       Goals  Patient Goals   Patient goals : Pt unable to verbalize goals this date d/t baseline dementia. Pt's spouse verbalized his goal for her to return home with his support  Short term goals  Time Frame for Short term goals: By discharge  Short term goal 1: Pt's caregiver will V/D Good understanding of AE/DME/modified techniques for increased safety and IND with self-care and functional mobility. Short term goal 2: Pt will perform functional mobility and transfers during self-care with SBA, no device, and Fair safety awareness. Short term goal 3: Pt will actively participate in 15+ minutes of self-care to the best of Pt's ability with Min VCs for participation/initiation/safety. Short term goal 4: Pt's caregiver will V/D Good understanding of community support resources for increased efficacy as Pt's primary caregiver.     Plan  Safety Devices  Safety Devices in place: Yes  Type of devices: Bed alarm in place, Call light within reach, Gait belt, Left in bed, Patient at risk for falls(telesitter)     Plan  Times per week: 3-5  Times per day: Daily  Current Treatment Recommendations: Balance Training, Functional Mobility Training, Endurance Training, Safety Education & Training, Patient/Caregiver Education & Training, Equipment Evaluation, Education, & procurement, Self-Care / ADL    Equipment Recommendations  Equipment Needed: (TBD)  OT Individual Minutes  Time In: 7501  Time Out: 7017  Minutes: 29    Electronically signed by Kadeem Landry OT on 5/6/19 at 2:30 PM

## 2019-05-06 NOTE — PROGRESS NOTES
was in the 140 range on arrival.  states she has had an episode similar to this back in July, 2018 and she was hospitalized. At the time they were outside taking a picture together with family and he told her to turn around and next thing he knew she passed out. That episode only lasted for one minute or so. No witnessed seizure like activity that time either. Patient admitted for syncopal work up. At this time she appears back at her normal baseline which is lying in bed calmly, comfortably and non verbal per the . Nurse states she was able to ambulate up and down the hallway without any issues today. She is eating lunch at this time with the help of her .     Hx CAD, HTN.  On  mg daily.     Past Medical History:     Past Medical History:   Diagnosis Date    CAD (coronary artery disease)     Cataracts, bilateral     Colon polyps 08/2017    Dementia     Gastric ulcer 08/2017    GERD (gastroesophageal reflux disease) 12/16/2013    Hiatal hernia     History of colon polyps 08/14/2017    Hyperlipidemia 6/13/2013    Hypertension     Hypothyroidism (acquired) 3/25/2017    Laryngitis     MI (myocardial infarction) ([facility] [address]     Tubular adenoma of colon 10/19/2017    Villous adenoma of colon 08/2017        Past Surgical History:     Past Surgical History:   Procedure Laterality Date    APPENDECTOMY      COLONOSCOPY  2009    COLONOSCOPY  08/14/2017    2.5 cm leion anorectal area-pathology-multiple fragments of villous and villotubular adenomas (most of the polyps removed), right colon--tubular adenoma    CORONARY ANGIOPLASTY WITH STENT PLACEMENT  01/28/1999    JOINT REPLACEMENT Right dec 2014    right shoulder    JOINT REPLACEMENT Left 2012    left knee    OTHER SURGICAL HISTORY      botox injections to vocal cords    OTHER SURGICAL HISTORY Right 03/19/15    I & D rt abd abscess    SIGMOIDOSCOPY  10/09/2017    Minimal probable hypertrophied mucosa at the previous Recent Labs     05/03/19  1637      K 4.2   CL 97*   CO2 29   BUN 15   CREATININE 0.68  1.07   GLUCOSE 117*         Lab Results   Component Value Date    CHOL 241 (H) 12/10/2018    LDLCHOLESTEROL 152 (H) 12/10/2018    HDL 53 12/10/2018    TRIG 179 (H) 12/10/2018    ALT 29 05/03/2019    AST 29 05/03/2019    TSH 3.75 12/10/2018    INR 1.1 05/03/2019    AERFYKRT42 557 02/21/2017         Imaging/Diagnostics:      EEG: normal       CT head - no acute process. Marked diffuse cerebral atrophy.      CTA head and neck - no flow-limiting stenosis or branch occlusion. All of the above medications, clinical laboratory, imaging and other diagnostic tests were reviewed by myself. Impression:      1. Episode of loss of consciousness, suspect vasovagal syncope. Doubt focal seizure, EEG normal.     2. Alzheimer's dementia with marked cerebral atrophy on CT head. Non verbal at baseline. No behavioral disturbances.     3. E. Coli UTI    4. Hx of CAD, HTN    Plan:   - F/u with Dr. Monserrat Mensah with currently scheduled appointment. - Abx per primary   - Discussed with    - Stable for discharge from neurology standpoint.         Electronically signed by Serge Stone DO on 5/6/2019 at 1:37 PM      Jennifer Shultz [facility]  Neurology

## 2019-05-06 NOTE — CARE COORDINATION
Writer Faxed DME order, Face to Face and Face sheet to [employer] asking to deliver to patients hospital room 2120 today.       Electronically signed by Yuri Hernández RN on 5/6/2019 at 1:13 PM

## 2019-05-06 NOTE — FLOWSHEET NOTE
05/06/19 1045   Encounter Summary   Services provided to: Patient   Referral/Consult From: Rounding   Complexity of Encounter Low   Length of Encounter 15 minutes   Spiritual/Hindu   Type Spiritual support   Assessment Sleeping   Intervention Prayer   Outcome Did not respond

## 2019-05-06 NOTE — DISCHARGE INSTR - COC
Continuity of Care Form    Patient Name: Nu William   :  1950  MRN:  [id number]    Admit date:  5/3/2019  Discharge date:      Code Status Order: [facility]   Advance Directives:   [address] Directive Type of Healthcare Directive Copy in [address] Agent's Name Healthcare Agent's Phone Number    [phone]  Yes, patient has an advance directive for healthcare treatment  Durable power of  for health care;Living will  No, copy requested from family  --  Pratima Franklin          Admitting Physician:  Allie Macias MD  PCP: Allie Macias MD    Discharging Nurse: [facility] Unit/Room#: 2120/2120-01  Discharging Unit Phone Number: [phone]    Emergency Contact:   Extended Emergency Contact Information  Primary Emergency Contact: Mike Barrientos  Address: [address] [place] [address] At [facility], [address] [place] [address] Phone: [phone]  Work Phone: [phone]  Mobile Phone: [phone]  Relation: Spouse  Secondary Emergency Contact: Nargis Floyd  Address: [address] [place] [address] Phone: [phone]  Relation: Child    Past Surgical History:  Past Surgical History:   Procedure Laterality Date    APPENDECTOMY      COLONOSCOPY      COLONOSCOPY  2017    2.5 cm leion anorectal area-pathology-multiple fragments of villous and villotubular adenomas (most of the polyps removed), right colon--tubular adenoma    CORONARY ANGIOPLASTY WITH STENT PLACEMENT  1999    JOINT REPLACEMENT Right dec 2014    right shoulder    JOINT REPLACEMENT Left     left knee    OTHER SURGICAL HISTORY      botox injections to vocal cords    OTHER SURGICAL HISTORY Right 03/19/15    I & D rt abd abscess    SIGMOIDOSCOPY  10/09/2017    Minimal probable hypertrophied mucosa at the previous polypectomy site, pathology tubular adenoma  TONSILLECTOMY      UPPER GASTROINTESTINAL ENDOSCOPY  10/09/2017    ulcer is healed, patient has significant esophagitis    UPPER GASTROINTESTINAL ENDOSCOPY  08/14/2017    ULCER AT GE JUNCTION, 2-3 CM LONG HIATAL HERNIA       Immunization History:   Immunization History   Administered Date(s) Administered    Pneumococcal 13-valent Conjugate (Dtrkpyr26) 03/20/2017    Pneumococcal Polysaccharide (Eitdktndy22) 01/13/2014       Active Problems:  Patient Active Problem List   Diagnosis Code    Hypertension I10    Hyperlipidemia E78.5    Verruca vulgaris B07.9    GERD (gastroesophageal reflux disease) K21.9    Open wound of abdomen S31.109A    Hypothyroidism (acquired) E03.9    Late onset Alzheimer's disease without behavioral disturbance G30.1, F02.80    Occult blood in stools R19.5    Diarrhea R19.7    History of colon polyps Z86.010    Hiatal hernia K44.9    Dementia F03.90    Colon polyps K63.5    Tubular adenoma of colon D12.6    Esophagitis K20.9    Villous adenoma of colon D37.4    Moderate protein-calorie malnutrition (HCC) E44.0    TIA (transient ischemic attack) G45.9    Severe malnutrition ([address] E43    Syncope and collapse R55    Acute cystitis without hematuria N30.00       Isolation/Infection:   Isolation          No Isolation            Nurse Assessment:  Last Vital Signs: BP (!) 152/78   Pulse 99   Temp 96.9 °F (36.1 °C) (Axillary)   Resp 20   Ht 5' (1.524 m)   Wt 110 lb 1 oz (49.9 kg)   SpO2 99%   BMI 21.50 kg/m²     Last documented pain score (0-10 scale):    Last Weight:   Wt Readings from Last 1 Encounters:   05/05/19 110 lb 1 oz (49.9 kg)     Mental Status:  alert    IV Access:  - None    Nursing Mobility/ADLs:  Walking   Assisted  Transfer  Assisted  Bathing  Dependent  Dressing  Dependent  Toileting  Dependent  Feeding  Dependent  Med Admin  Dependent  Med Delivery   whole and prefers mixed with applesauce     Wound Care Documentation and Therapy:  Wound 03/31/15 Wound #1 right lat lower abd appeared gradually (surg 3/18/15) etiology abscess (Active)   Number of days: 1496       Incision 03/19/15 Abdomen Right (Active)   Number of days: 1508        Elimination:  Continence:   · Bowel: No  · Bladder: No  Urinary Catheter: None   Colostomy/Ileostomy/Ileal Conduit: No       Date of Last BM: 5/6/19    Intake/Output Summary (Last 24 hours) at 5/6/2019 0850  Last data filed at 5/6/2019 0824  Gross per 24 hour   Intake 640 ml   Output --   Net 640 ml     I/O last 3 completed shifts: In: 400 [P.O.:200; I.V.:200]  Out: -     Safety Concerns: At Risk for Falls    Impairments/Disabilities:      None    Nutrition Therapy:  Current Nutrition Therapy:   - Oral Diet:  Dysphagia 1 pureed and Cardiac    Routes of Feeding: Oral  Liquids: No Restrictions  Daily Fluid Restriction: no  Last Modified Barium Swallow with Video (Video Swallowing Test): not done    Treatments at the Time of Hospital Discharge:   Respiratory Treatments: none  Oxygen Therapy:  is not on home oxygen therapy. Ventilator:    - No ventilator support    Rehab Therapies: Physical Therapy and Occupational Therapy  Weight Bearing Status/Restrictions: No weight bearing restirctions  Other Medical Equipment (for information only, NOT a DME order):   Wheelchair- being delivered to home at end of week by [employer]  Other Treatments: Skilled Nursing Assessment and monitoring, Medication Eduction    Patient's personal belongings (please select all that are sent with patient):  None    RN SIGNATURE:  Electronically signed by Allyssa Crowell RN on 5/6/19 at 3:57 PM    CASE MANAGEMENT/SOCIAL WORK SECTION    Inpatient Status Date:     Readmission Risk Assessment Score:  Readmission Risk              Risk of Unplanned Readmission:        11           Discharging to Facility/ [address] [place] [address],[address]  Phone [phone]  Fax [phone]    Home health care agency's  to evaluate patient two weeks prior to discharge from home health to determine post-discharge services. / signature: Electronically signed by Mahesh Naidu RN on 5/6/19 at 1:19 PM    PHYSICIAN SECTION    Prognosis: Fair    Condition at Discharge: Stable    Rehab Potential (if transferring to Rehab): Fair    Recommended Labs or Other Treatments After Discharge:     Physician Certification: I certify the above information and transfer of Nu William  is necessary for the continuing treatment of the diagnosis listed and that she requires Home Care for greater 30 days.      Update Admission H&P: No change in H&P    PHYSICIAN SIGNATURE:  Electronically signed by Allie Macias MD on 5/6/19 at 8:50 AM

## 2019-05-06 NOTE — CARE COORDINATION
Phone   RETIRED                  Insurance & Policy [place] Information     Primary Insurance  Insurance/Subscriber ID:  [id number]  Subscriber Name:  Katja Mcmahon              Relationship to Patient: Self     Secondary Insurance  Insurance/Subscriber ID: [id number]  [address] Name: Saurabh Lewis  Relationship to Patient: Spouse  Signed ABN: N    Payor Name:  Jammie Benitez   Plan:  MEDICARE PART A AND B   Group:         Payor Name: Alex Gift  Plan:  [address] [place] [address]  Group: [id number]  Worker's Comp Date of Injury:            Pili Jenkins RN   Registered Nurse   Case Management   Care Coordination   Signed   Date of Service:  5/4/2019  4:46 PM               Signed                 Show:Clear all  [x]Manual[x]Template[]Copied    Added by:  [x]Elizabeth Grayson RN      []Junior for details      Tabitha Saxena was evaluated today and a DME order was entered for a lightweight wheelchair because she requires this to successfully complete daily living tasks of bathing, personal cares, ambulating, grooming and hygiene. A lightweight wheelchair is necessary due to the patient's impaired ambulation and mobility restrictions. The patient would not be able to resolve these daily living tasks using a cane or walker. The patient can self-propel a lightweight wheelchair safely in their home and can maneuver within their home with adequate access. The patient cannot self-propel in a standard wheelchair. The need for this equipment was discussed with the patient and she understands, is in agreement, and has not expressed an unwillingness to use the wheelchair.                     Cosigned by: Gregory Farnsworth MD at 5/5/2019  3:42 PM     [address] Encounter Date/Time: 5/3/2019 25 [facility] Account: [de-identified]    MRN: [id number]    Patient:  Tabitha Saxena   Contact Serial #: [id number]            ENCOUNTER          Patient Class: Observation Private Enc?   No Unit RM BD: [facility] PROG 2120/2120-01 Hospital Service: Intermediate   ADM DX: TIA (transient ischemic *   ADM Provider: Chandrika William MD   Procedure:     ATT Provider: Chandrika William MD   REF Provider:        PATIENT                 Name: Meagan John : 1950 (68 yrs)   Address: [address] [place], [place] [address]te [place] S Sex: Female   [place]: [place] [address]         Marital Status:    Employer: RETIRED         [employer]ism: [facility]   Primary Care Provider: Chandrika William MD         Primary Phone: [phone]   EMERGENCY CONTACT   Contact Name Legal Guardian? Relationship to Patient Home Phone Work Phone   1. Mike Barrientos  2. EverettNargis      Spouse  Child [phone]            GUARANTOR            Guarantor: Kaitlin [facility]     : 1950   Address: [facility] Marbella Jordan S Sex: Female   [place] [address]     Relation to Patient: Self       Home Phone: [phone]   Guarantor ID: [id number]       Work Phone:     Guarantor Employer: RETIRED         Status: RETIRED      COVERAGE        PRIMARY INSURANCE   Payor: MEDICARE Plan: MEDICARE PART A AND B   Payor Address: [place] [address]       Group Number:   Insurance Type: INDEMNITY   Subscriber Name: Roxanne Peguero : 1950   Subscriber ID: [id number] Pat. Rel. to Sub: Self   SECONDARY INSURANCE   Payor: [facility] Plan: [address] [place] [address]   Payor Address:  [facility] [id number], [address]          Group Number: [id number] Insurance Type: [id number] Name: Elisabeth Fried : 1951   Subscriber ID: [id number] Reta Rather.  Rel. to Sub: SPOUSE

## 2019-05-06 NOTE — PROCEDURES
EEG REPORT    Patient Name: Ina Leonard  Patient MRN: [id number]    Referring Physician: Mike Quiles DO  Dictating Physician: Mike Quiles DO  Date: 5/6/19    CLINICAL HISTORY: This EEG was performed on a 77 y.o. female with an episode of loss of consciousness. The patient has a history of Alzheimer's dementia. It is being performed to evaluate for seizure activity. CURRENT ANTI-EPILEPTIC MEDICATIONS: none     DESCRIPTION: Wakefulness, drowsiness, and stage II sleep were obtained. During wakefulness there was a posterior dominant background of regular, symmetrical, reactive, low voltage, 10 Hz with an anterior background of admixed alpha and beta frequencies. During drowsiness there was attenuation of the background activity and symmetrical vertex sharp waves. During brief stage II sleep there were symmetrical sleep spindles, and vertex waves. Photic stimulation evoked a symmetrical posterior driving response at some flash frequencies. Hyperventilation was not performed due to the patients clinical condition. EEG DIAGNOSIS: Normal    CLINICAL INTERPRETATION: No epileptiform activity or evidence of  focal cerebral dysfunction was present. No electrographic seizures were recorded.        Mike Quiles [address] [place] [address]

## 2019-05-06 NOTE — CARE COORDINATION
ONGOING DISCHARGE PLAN:    Spoke with patient's  Elizabeth Mcneal regarding patients discharge plans. Elizabeth Mcneal advised his wife is to go home with [facility]. Elizabeth Mcneal also mentioned that he received a call from [address] [place] [address] and they advised the wheelchair was ordered and will be delivered to home at the end of the week. Remains on Iv Cipro    EEG performed and completed today. Waiting on results. Will continue to follow for additional discharge needs.     Electronically signed by Chaparro Mo RN on 5/6/2019 at 1:15 PM

## 2019-05-06 NOTE — PROGRESS NOTES
Physical Therapy  Facility/Department: [address] [place] [address] PROGRESSIVE CARE  Daily Treatment Note  NAME: Louann Del Valle  : 1950  MRN: [id number]    Date of Service: 2019    Discharge Recommendations:  24 hour supervision or assist        Patient Diagnosis(es): The primary encounter diagnosis was Syncope and collapse. Diagnoses of TIA (transient ischemic attack), Late onset Alzheimer's disease without behavioral disturbance, and Severe malnutrition ([facility] [address] were also pertinent to this visit. has a past medical history of CAD (coronary artery disease), Cataracts, bilateral, Colon polyps, Dementia, Gastric ulcer, GERD (gastroesophageal reflux disease), Hiatal hernia, History of colon polyps, Hyperlipidemia, Hypertension, Hypothyroidism (acquired), Laryngitis, MI (myocardial infarction) ([facility] [address] Tubular adenoma of colon, and Villous adenoma of colon. has a past surgical history that includes Appendectomy; joint replacement (Right, dec 2014); joint replacement (Left, ); Tonsillectomy; Colonoscopy (); Upper gastrointestinal endoscopy (10/09/2017); other surgical history; other surgical history (Right, 03/19/15); Coronary angioplasty with stent (1999); Colonoscopy (2017); Upper gastrointestinal endoscopy (2017); and Sigmoidoscopy (10/09/2017). Restrictions  Restrictions/Precautions  Restrictions/Precautions: Fall Risk  Position Activity Restriction  Other position/activity restrictions: Pt is aphasic at baseline, severe demantia. Subjective   General  Additional Pertinent Hx: Per ER note- DW daughter Mary Hsieh, she states they were feeding her puree food at 3 pm, Aklia Chaparro pushed it away, then had syncope event and right side seemed to be flacid. She briefly did some chest compressions, she called EMS for stroke alert. She is much improved now, aphasic at baseline. Daughter states she has severe advanced dementia, lives at home with family.   Subjective  Subjective: Pt is in bed upon arrival. Pt is Education: Unable to educate pt due to hipages Group. REQUIRES PT FOLLOW UP: Yes  Activity Tolerance  Activity Tolerance: Patient limited by cognitive status     Goals  Short term goals  Time Frame for Short term goals: 4 visits  Short term goal 1:  Pt able to perform bed mobility CGA with minimal tactile cues   Short term goal 2:  Pt able to perfrom transfers CGA   Short term goal 3:  Pt able to ambulate HHA dist of atleast 200 ft. Patient Goals   Patient goals : Pt unable to state.     Plan    Plan  Times per week: 5 x/wk  Current Treatment Recommendations: Balance Training, Functional Mobility Training, Transfer Training, Gait Training, Patient/Caregiver Education & Training  Plan Comment: to continue PT per POC  Safety Devices  Type of devices: Left in bed, Call light within reach, Telesitter in use, Bed alarm in place  Restraints  Initially in place: No     Therapy Time   Individual Concurrent Group Co-treatment   Time In 1044         Time Out 1113         Minutes [place], [place]

## 2019-05-06 NOTE — PLAN OF CARE
Problem: Falls - Risk of:  Goal: Will remain free from falls  Description  Will remain free from falls  5/6/2019 0327 by Gera Stanley RN  Outcome: Ongoing  Note:   Pt remains free from falls this shift. Bed is locked, in lowest position, and call light within reach.       Problem: ACTIVITY INTOLERANCE/IMPAIRED MOBILITY  Goal: Mobility/activity is maintained at optimum level for patient  5/6/2019 0327 by Gera Stanley RN  Outcome: Ongoing

## 2019-05-07 NOTE — DISCHARGE SUMMARY
Betty Chaparro MD in one week    Discharge Medications:    Valdez Restrepo   Lambert Medication Instructions ELR:[id number]    Printed on:05/06/19 2584   Medication Information                      amLODIPine (NORVASC) 5 MG tablet  TAKE 1 TABLET DAILY             aspirin 81 MG tablet  Take 162 mg by mouth daily              busPIRone (BUSPAR) 5 MG tablet  TAKE 1 TABLET TWICE A DAY             ciprofloxacin (CIPRO) 500 MG tablet  Take 1 tablet by mouth 2 times daily for 7 days             isosorbide mononitrate (IMDUR) 30 MG extended release tablet  TAKE 1 TABLET DAILY             levothyroxine (SYNTHROID) 25 MCG tablet  TAKE 1 TABLET DAILY             lisinopril (PRINIVIL;ZESTRIL) 20 MG tablet  TAKE 1 TABLET DAILY             memantine (NAMENDA) 10 MG tablet  Take 10 mg by mouth 2 times daily             Multiple Vitamins-Minerals (MULTIVITAMIN WOMEN 50+ PO)  Take 1 tablet by mouth daily             NITROSTAT 0.4 MG SL tablet               Nutritional Supplements (ENSURE HIGH PROTEIN) LIQD  Take 1 Can by mouth 3 times daily (with meals)             omeprazole (PRILOSEC) 40 MG delayed release capsule  TAKE 1 CAPSULE DAILY             traZODone (DESYREL) 50 MG tablet  TAKE 1 TABLET EVERY NIGHT AT BEDTIME             ZETIA 10 MG tablet  1 tablet daily                  Activity: activity as tolerated    Diet: regular diet    Time Spent on discharge is more than 30 minutes in the examination, evaluation, counseling and review of medications and discharge plan.       Electronically signed by Betty Chaparro MD on 5/6/2019 at 9:05 PM

## 2019-05-09 LAB — GLUCOSE BLD-MCNC: 98 MG/DL (ref 65–105)

## 2019-05-24 DIAGNOSIS — K21.9 GASTROESOPHAGEAL REFLUX DISEASE WITHOUT ESOPHAGITIS: Primary | ICD-10-CM

## 2019-05-24 RX ORDER — OMEPRAZOLE 40 MG/1
CAPSULE, DELAYED RELEASE ORAL
Qty: 90 CAPSULE | Refills: 1 | Status: SHIPPED | OUTPATIENT
Start: 2019-05-24 | End: 2019-07-12 | Stop reason: ALTCHOICE

## 2019-05-30 LAB
EKG ATRIAL RATE: 61 BPM
EKG P AXIS: 71 DEGREES
EKG P-R INTERVAL: 134 MS
EKG Q-T INTERVAL: 426 MS
EKG QRS DURATION: 78 MS
EKG QTC CALCULATION (BAZETT): 428 MS
EKG R AXIS: 32 DEGREES
EKG T AXIS: 57 DEGREES
EKG VENTRICULAR RATE: 61 BPM

## 2019-06-24 RX ORDER — MEMANTINE HYDROCHLORIDE 10 MG/1
TABLET ORAL
Qty: 180 TABLET | Refills: 0 | Status: SHIPPED | OUTPATIENT
Start: 2019-06-24 | End: 2020-01-17 | Stop reason: SDUPTHER

## 2019-07-12 ENCOUNTER — OFFICE VISIT (OUTPATIENT)
Dept: NEUROLOGY | Age: 69
End: 2019-07-12
Payer: MEDICARE

## 2019-07-12 VITALS
BODY MASS INDEX: 21.79 KG/M2 | HEIGHT: 60 IN | SYSTOLIC BLOOD PRESSURE: 132 MMHG | DIASTOLIC BLOOD PRESSURE: 80 MMHG | WEIGHT: 111 LBS | HEART RATE: 78 BPM

## 2019-07-12 DIAGNOSIS — F02.80 ALZHEIMER'S DISEASE OF OTHER ONSET WITHOUT BEHAVIORAL DISTURBANCE: Primary | ICD-10-CM

## 2019-07-12 DIAGNOSIS — G30.8 ALZHEIMER'S DISEASE OF OTHER ONSET WITHOUT BEHAVIORAL DISTURBANCE: Primary | ICD-10-CM

## 2019-07-12 DIAGNOSIS — J38.3 SPASMODIC DYSPHONIA: ICD-10-CM

## 2019-07-12 PROCEDURE — 4040F PNEUMOC VAC/ADMIN/RCVD: CPT | Performed by: PSYCHIATRY & NEUROLOGY

## 2019-07-12 PROCEDURE — 3017F COLORECTAL CA SCREEN DOC REV: CPT | Performed by: PSYCHIATRY & NEUROLOGY

## 2019-07-12 PROCEDURE — 99214 OFFICE O/P EST MOD 30 MIN: CPT | Performed by: PSYCHIATRY & NEUROLOGY

## 2019-07-12 PROCEDURE — G8420 CALC BMI NORM PARAMETERS: HCPCS | Performed by: PSYCHIATRY & NEUROLOGY

## 2019-07-12 PROCEDURE — G8598 ASA/ANTIPLAT THER USED: HCPCS | Performed by: PSYCHIATRY & NEUROLOGY

## 2019-07-12 PROCEDURE — 1123F ACP DISCUSS/DSCN MKR DOCD: CPT | Performed by: PSYCHIATRY & NEUROLOGY

## 2019-07-12 PROCEDURE — G8400 PT W/DXA NO RESULTS DOC: HCPCS | Performed by: PSYCHIATRY & NEUROLOGY

## 2019-07-12 PROCEDURE — 1090F PRES/ABSN URINE INCON ASSESS: CPT | Performed by: PSYCHIATRY & NEUROLOGY

## 2019-07-12 PROCEDURE — 1036F TOBACCO NON-USER: CPT | Performed by: PSYCHIATRY & NEUROLOGY

## 2019-07-12 PROCEDURE — G8427 DOCREV CUR MEDS BY ELIG CLIN: HCPCS | Performed by: PSYCHIATRY & NEUROLOGY

## 2019-07-12 RX ORDER — MEMANTINE HYDROCHLORIDE 10 MG/1
10 TABLET ORAL 2 TIMES DAILY
Qty: 180 TABLET | Refills: 3 | Status: SHIPPED | OUTPATIENT
Start: 2019-07-12 | End: 2020-11-17

## 2019-07-12 ASSESSMENT — ENCOUNTER SYMPTOMS
EYES NEGATIVE: 1
GASTROINTESTINAL NEGATIVE: 1
RESPIRATORY NEGATIVE: 1

## 2019-07-12 NOTE — LETTER
 Multiple Vitamins-Minerals (MULTIVITAMIN WOMEN 50+ PO) Take 1 tablet by mouth daily      busPIRone (BUSPAR) 5 MG tablet TAKE 1 TABLET TWICE A  tablet 1    isosorbide mononitrate (IMDUR) 30 MG extended release tablet TAKE 1 TABLET DAILY 90 tablet 1    ZETIA 10 MG tablet 1 tablet daily      aspirin 81 MG tablet Take 162 mg by mouth daily       amLODIPine (NORVASC) 5 MG tablet TAKE 1 TABLET DAILY 90 tablet 1     No current facility-administered medications for this visit. Allergies   Allergen Reactions    Cephalexin     Erythromycin     Influenza Vac Typ A&B Surf Ant     Iodides     Pcn [Penicillins]     Sulfa Antibiotics        Review of Systems   Constitutional: Positive for appetite change and unexpected weight change. HENT: Negative. Eyes: Negative. Respiratory: Negative. Cardiovascular: Negative. Gastrointestinal: Negative. Endocrine: Negative. Genitourinary: Negative. Musculoskeletal: Negative. Neurological: Positive for speech difficulty. Hematological: Negative. Psychiatric/Behavioral: Positive for confusion. Objective:   Physical Exam    Vitals:    07/12/19 1457   BP: 132/80   Pulse: 78     weight: 111 lb (50.3 kg)    Neurological Examination  Constitutional .General exam well groomed   Ears /Nose/Throat: external ear . Normal exam  Neck and thyroid . Normal size  Respiratory . Breathsounds clear bilaterally  Cardiovascular:  Auscultation of heart with regular rate and rhythm and normal heart sounds  Musculoskeletal. Muscle tone mild paratonia                                                        Muscle strength 5/5 strength throughout                                                                                 No dysmetria or dysdiadokinesis  No tremor   Normal fine motor  Gait minor apraxia with right hand tremor wither hand hanging at her side   Orientation Alert and oriented x 0 .  nonverbal.  Not serial commands

## 2019-07-12 NOTE — PROGRESS NOTES
08/14/2017    Hyperlipidemia 6/13/2013    Hypertension     Hypothyroidism (acquired) 3/25/2017    Laryngitis     MI (myocardial infarction) ([facility] [address]     Tubular adenoma of colon 10/19/2017    UTI (urinary tract infection) 2019    Villous adenoma of colon 08/2017       Past Surgical History:   Procedure Laterality Date    APPENDECTOMY      COLONOSCOPY  2009    COLONOSCOPY  08/14/2017    2.5 cm leion anorectal area-pathology-multiple fragments of villous and villotubular adenomas (most of the polyps removed), right colon--tubular adenoma    CORONARY ANGIOPLASTY WITH STENT PLACEMENT  01/28/1999    JOINT REPLACEMENT Right dec 2014    right shoulder    JOINT REPLACEMENT Left 2012    left knee    OTHER SURGICAL HISTORY      botox injections to vocal cords    OTHER SURGICAL HISTORY Right 03/19/15    I & D rt abd abscess    SIGMOIDOSCOPY  10/09/2017    Minimal probable hypertrophied mucosa at the previous polypectomy site, pathology tubular adenoma    TONSILLECTOMY      UPPER GASTROINTESTINAL ENDOSCOPY  10/09/2017    ulcer is healed, patient has significant esophagitis    UPPER GASTROINTESTINAL ENDOSCOPY  08/14/2017    ULCER AT GE JUNCTION, 2-3 CM LONG HIATAL HERNIA       Family History   Problem Relation Age of Onset    Heart Disease Mother     High Blood Pressure Mother     Heart Disease Father     Other Maternal Uncle         alzeimer's    Other Maternal Cousin 79        alzeimers       Social History     Socioeconomic History    Marital status:      Spouse name: None    Number of children: None    Years of education: None    Highest education level: None   Occupational History    None   Social Needs    Financial resource strain: None    Food insecurity:     Worry: None     Inability: None    Transportation needs:     Medical: None     Non-medical: None   Tobacco Use    Smoking status: Never Smoker    Smokeless tobacco: Never Used   Substance and Sexual Activity    Alcohol use:  No

## 2019-07-31 NOTE — COMMUNICATION BODY
08/14/2017    Hyperlipidemia 6/13/2013    Hypertension     Hypothyroidism (acquired) 3/25/2017    Laryngitis     MI (myocardial infarction) ([facility] [address]     Tubular adenoma of colon 10/19/2017    UTI (urinary tract infection) 2019    Villous adenoma of colon 08/2017       Past Surgical History:   Procedure Laterality Date    APPENDECTOMY      COLONOSCOPY  2009    COLONOSCOPY  08/14/2017    2.5 cm leion anorectal area-pathology-multiple fragments of villous and villotubular adenomas (most of the polyps removed), right colon--tubular adenoma    CORONARY ANGIOPLASTY WITH STENT PLACEMENT  01/28/1999    JOINT REPLACEMENT Right dec 2014    right shoulder    JOINT REPLACEMENT Left 2012    left knee    OTHER SURGICAL HISTORY      botox injections to vocal cords    OTHER SURGICAL HISTORY Right 03/19/15    I & D rt abd abscess    SIGMOIDOSCOPY  10/09/2017    Minimal probable hypertrophied mucosa at the previous polypectomy site, pathology tubular adenoma    TONSILLECTOMY      UPPER GASTROINTESTINAL ENDOSCOPY  10/09/2017    ulcer is healed, patient has significant esophagitis    UPPER GASTROINTESTINAL ENDOSCOPY  08/14/2017    ULCER AT GE JUNCTION, 2-3 CM LONG HIATAL HERNIA       Family History   Problem Relation Age of Onset    Heart Disease Mother     High Blood Pressure Mother     Heart Disease Father     Other Maternal Uncle         alzeimer's    Other Maternal Cousin 79        alzeimers       Social History     Socioeconomic History    Marital status:      Spouse name: None    Number of children: None    Years of education: None    Highest education level: None   Occupational History    None   Social Needs    Financial resource strain: None    Food insecurity:     Worry: None     Inability: None    Transportation needs:     Medical: None     Non-medical: None   Tobacco Use    Smoking status: Never Smoker    Smokeless tobacco: Never Used   Substance and Sexual Activity    Alcohol use:  No

## 2020-01-17 ENCOUNTER — OFFICE VISIT (OUTPATIENT)
Dept: NEUROLOGY | Age: 70
End: 2020-01-17
Payer: MEDICARE

## 2020-01-17 ENCOUNTER — TELEPHONE (OUTPATIENT)
Dept: NEUROLOGY | Age: 70
End: 2020-01-17

## 2020-01-17 VITALS
BODY MASS INDEX: 23.16 KG/M2 | HEIGHT: 60 IN | WEIGHT: 118 LBS | SYSTOLIC BLOOD PRESSURE: 135 MMHG | HEART RATE: 79 BPM | DIASTOLIC BLOOD PRESSURE: 75 MMHG

## 2020-01-17 PROCEDURE — 3017F COLORECTAL CA SCREEN DOC REV: CPT | Performed by: PSYCHIATRY & NEUROLOGY

## 2020-01-17 PROCEDURE — G8420 CALC BMI NORM PARAMETERS: HCPCS | Performed by: PSYCHIATRY & NEUROLOGY

## 2020-01-17 PROCEDURE — G8484 FLU IMMUNIZE NO ADMIN: HCPCS | Performed by: PSYCHIATRY & NEUROLOGY

## 2020-01-17 PROCEDURE — 1090F PRES/ABSN URINE INCON ASSESS: CPT | Performed by: PSYCHIATRY & NEUROLOGY

## 2020-01-17 PROCEDURE — 99214 OFFICE O/P EST MOD 30 MIN: CPT | Performed by: PSYCHIATRY & NEUROLOGY

## 2020-01-17 PROCEDURE — G8427 DOCREV CUR MEDS BY ELIG CLIN: HCPCS | Performed by: PSYCHIATRY & NEUROLOGY

## 2020-01-17 PROCEDURE — 1036F TOBACCO NON-USER: CPT | Performed by: PSYCHIATRY & NEUROLOGY

## 2020-01-17 PROCEDURE — G8400 PT W/DXA NO RESULTS DOC: HCPCS | Performed by: PSYCHIATRY & NEUROLOGY

## 2020-01-17 PROCEDURE — 4040F PNEUMOC VAC/ADMIN/RCVD: CPT | Performed by: PSYCHIATRY & NEUROLOGY

## 2020-01-17 PROCEDURE — 1123F ACP DISCUSS/DSCN MKR DOCD: CPT | Performed by: PSYCHIATRY & NEUROLOGY

## 2020-01-17 RX ORDER — TRAZODONE HYDROCHLORIDE 50 MG/1
TABLET ORAL
Qty: 90 TABLET | Refills: 1 | Status: SHIPPED | OUTPATIENT
Start: 2020-01-17 | End: 2020-07-15

## 2020-01-17 ASSESSMENT — ENCOUNTER SYMPTOMS
EYES NEGATIVE: 1
GASTROINTESTINAL NEGATIVE: 1
RESPIRATORY NEGATIVE: 1
ALLERGIC/IMMUNOLOGIC NEGATIVE: 1

## 2020-01-17 NOTE — PROGRESS NOTES
 Laryngitis     MI (myocardial infarction) ([facility] [address]     Tubular adenoma of colon 10/19/2017    UTI (urinary tract infection) 2019    Villous adenoma of colon 08/2017       Past Surgical History:   Procedure Laterality Date    APPENDECTOMY      COLONOSCOPY  2009    COLONOSCOPY  08/14/2017    2.5 cm leion anorectal area-pathology-multiple fragments of villous and villotubular adenomas (most of the polyps removed), right colon--tubular adenoma    CORONARY ANGIOPLASTY WITH STENT PLACEMENT  01/28/1999    JOINT REPLACEMENT Right dec 2014    right shoulder    JOINT REPLACEMENT Left 2012    left knee    OTHER SURGICAL HISTORY      botox injections to vocal cords    OTHER SURGICAL HISTORY Right 03/19/15    I & D rt abd abscess    SIGMOIDOSCOPY  10/09/2017    Minimal probable hypertrophied mucosa at the previous polypectomy site, pathology tubular adenoma    TONSILLECTOMY      UPPER GASTROINTESTINAL ENDOSCOPY  10/09/2017    ulcer is healed, patient has significant esophagitis    UPPER GASTROINTESTINAL ENDOSCOPY  08/14/2017    ULCER AT GE JUNCTION, 2-3 CM LONG HIATAL HERNIA       Family History   Problem Relation Age of Onset    Heart Disease Mother     High Blood Pressure Mother     Heart Disease Father     Other Maternal Uncle         alzeimer's    [facility] 79        [facility]       Social History     Socioeconomic History    Marital status:      Spouse name: None    Number of children: None    Years of education: None    Highest education level: None   Occupational History    None   Social Needs    Financial resource strain: None    Food insecurity:     Worry: None     Inability: None    Transportation needs:     Medical: None     Non-medical: None   Tobacco Use    Smoking status: Never Smoker    Smokeless tobacco: Never Used   Substance and Sexual Activity    Alcohol use: No    Drug use: No    Sexual activity: None   Lifestyle    Physical activity:     Days per week: None     Minutes per session: None    Stress: None   Relationships    Social connections:     Talks on phone: None     Gets together: None     Attends Worship service: None     Active member of club or organization: None     Attends meetings of clubs or organizations: None     Relationship status: None    Intimate partner violence:     Fear of current or ex partner: None     Emotionally abused: None     Physically abused: None     Forced sexual activity: None   Other Topics Concern    None   Social History Narrative    None       Current Outpatient Medications   Medication Sig Dispense Refill    traZODone (DESYREL) 50 MG tablet Take 1 po qhs 90 tablet 1    amLODIPine (NORVASC) 5 MG tablet TAKE 1 TABLET DAILY 90 tablet 1    lisinopril (PRINIVIL;ZESTRIL) 20 MG tablet TAKE 1 TABLET DAILY 90 tablet 4    levothyroxine (SYNTHROID) 25 MCG tablet TAKE 1 TABLET DAILY 90 tablet 1    memantine (NAMENDA) 10 MG tablet Take 1 tablet by mouth 2 times daily 180 tablet 3    nitroGLYCERIN (NITROSTAT) 0.4 MG SL tablet Place 1 tablet under the tongue every 5 minutes as needed for Chest pain 25 tablet 1    Nutritional Supplements (ENSURE HIGH PROTEIN) LIQD Take 1 Can by mouth 3 times daily (with meals) 90 Can 3    Multiple Vitamins-Minerals (MULTIVITAMIN WOMEN 50+ PO) Take 1 tablet by mouth daily      busPIRone (BUSPAR) 5 MG tablet TAKE 1 TABLET TWICE A  tablet 1    isosorbide mononitrate (IMDUR) 30 MG extended release tablet TAKE 1 TABLET DAILY 90 tablet 1    ZETIA 10 MG tablet 1 tablet daily      aspirin 81 MG tablet Take 162 mg by mouth daily        No current facility-administered medications for this visit. Allergies   Allergen Reactions    Cephalexin     Erythromycin     Influenza Vac Typ A&B Surf Ant     Iodides     Pcn [Penicillins]     Sulfa Antibiotics        Review of Systems   Constitutional: Negative. HENT: Negative. Eyes: Negative. Respiratory: Negative.     Cardiovascular: Negative. Gastrointestinal: Negative. Endocrine: Negative. Genitourinary: Negative. Musculoskeletal: Positive for gait problem. Allergic/Immunologic: Negative. Hematological: Negative. Psychiatric/Behavioral: Positive for confusion. Objective:   Physical Exam    Vitals:    01/17/20 1422   BP: 135/75   Pulse: 79     weight: 118 lb (53.5 kg)    Neurological Examination  Constitutional .General exam well groomed   Ears /Nose/Throat: external ear . Normal exam  Neck and thyroid . Normal size  Respiratory . Breathsounds clear bilaterally  Cardiovascular: Auscultation of heart with regular rate and rhythm and normal heart sounds  Musculoskeletal. Muscle tone mild paratonia                                                        Muscle strength 5/5 strength throughout                                                                                 No dysmetria or dysdiadokinesis  No tremor   Normal fine motor  Gait apraxia with right hand tremor wither hand hanging at her side   Orientation Alert and oriented x 0 .  nonverbal.  Not serial commands  Attention and concentration reduced  Short term memory 0 words out of 3 in one minute   Language process nonverbal.  Not following commands   Cranial nerve 2 normal acuety and visual fields  Cranial nerve 3, 4 and 6 . Extraocular muscles are intact . Pupils are equal and reactive   Cranial nerve 5 . Normal strength of masseter and temporalis . Intact corneal reflex. Normal facial sensation  Cranial nerve 7 normal exam   Cranial nerve 8. Grossly intact hearing   Cranial nerve 9 and 10. Symmetric palate elevation   Cranial nerve 11 , 5 out of 5 strength   Cranial Nerve 12 midline tongue . No atrophy  Sensation . Normal pinprick and light touch   Deep Tendon Reflexes normal  Plantar response flexor bilaterally    Assessment:      1. Primary progressive aphasia (HCC)    2. Gait abnormality    3.  Dementia associated with other underlying disease without behavioral

## 2020-01-17 NOTE — LETTER
with mild chronic periventricular small vessel ischemia . Moderate cerebral atrophy .  EEG normal . Neuropsychological testing impaired range in every cognitive realm concordant for alzheimer's dementia      Past Medical History:   Diagnosis Date    CAD (coronary artery disease)     Cataracts, bilateral     Colon polyps 08/2017    Dementia ([facility] [address]     Gastric ulcer 08/2017    GERD (gastroesophageal reflux disease) 12/16/2013    Hiatal hernia     History of colon polyps 08/14/2017    Hyperlipidemia 6/13/2013    Hypertension     Hypothyroidism (acquired) 3/25/2017    Laryngitis     MI (myocardial infarction) ([facility] [address]     Tubular adenoma of colon 10/19/2017    UTI (urinary tract infection) 2019    Villous adenoma of colon 08/2017       Past Surgical History:   Procedure Laterality Date    APPENDECTOMY      COLONOSCOPY  2009    COLONOSCOPY  08/14/2017    2.5 cm leion anorectal area-pathology-multiple fragments of villous and villotubular adenomas (most of the polyps removed), right colon--tubular adenoma    CORONARY ANGIOPLASTY WITH STENT PLACEMENT  01/28/1999    JOINT REPLACEMENT Right dec 2014    right shoulder    JOINT REPLACEMENT Left 2012    left knee    OTHER SURGICAL HISTORY      botox injections to vocal cords    OTHER SURGICAL HISTORY Right 03/19/15    I & D rt abd abscess    SIGMOIDOSCOPY  10/09/2017    Minimal probable hypertrophied mucosa at the previous polypectomy site, pathology tubular adenoma    TONSILLECTOMY      UPPER GASTROINTESTINAL ENDOSCOPY  10/09/2017    ulcer is healed, patient has significant esophagitis    UPPER GASTROINTESTINAL ENDOSCOPY  08/14/2017    ULCER AT GE JUNCTION, 2-3 CM LONG HIATAL HERNIA       Family History   Problem Relation Age of Onset    Heart Disease Mother     High Blood Pressure Mother     Heart Disease Father     Other Maternal Uncle         alzeimer's    [facility] 79        [facility]       Social History     Socioeconomic History  Marital status:      Spouse name: None    Number of children: None    Years of education: None    Highest education level: None   Occupational History    None   Social Needs    Financial resource strain: None    Food insecurity:     Worry: None     Inability: None    Transportation needs:     Medical: None     Non-medical: None   Tobacco Use    Smoking status: Never Smoker    Smokeless tobacco: Never Used   Substance and Sexual Activity    Alcohol use: No    Drug use: No    Sexual activity: None   Lifestyle    Physical activity:     Days per week: None     Minutes per session: None    Stress: None   Relationships    Social connections:     Talks on phone: None     Gets together: None     Attends [employer] service: None     Active member of club or organization: None     Attends meetings of clubs or organizations: None     Relationship status: None    Intimate partner violence:     Fear of current or ex partner: None     Emotionally abused: None     Physically abused: None     Forced sexual activity: None   Other Topics Concern    None   Social History Narrative    None       Current Outpatient Medications   Medication Sig Dispense Refill    traZODone (DESYREL) 50 MG tablet Take 1 po qhs 90 tablet 1    amLODIPine (NORVASC) 5 MG tablet TAKE 1 TABLET DAILY 90 tablet 1    lisinopril (PRINIVIL;ZESTRIL) 20 MG tablet TAKE 1 TABLET DAILY 90 tablet 4    levothyroxine (SYNTHROID) 25 MCG tablet TAKE 1 TABLET DAILY 90 tablet 1    memantine (NAMENDA) 10 MG tablet Take 1 tablet by mouth 2 times daily 180 tablet 3    nitroGLYCERIN (NITROSTAT) 0.4 MG SL tablet Place 1 tablet under the tongue every 5 minutes as needed for Chest pain 25 tablet 1    Nutritional Supplements (ENSURE HIGH PROTEIN) LIQD Take 1 Can by mouth 3 times daily (with meals) 90 Can 3    Multiple Vitamins-Minerals (MULTIVITAMIN WOMEN 50+ PO) Take 1 tablet by mouth daily  busPIRone (BUSPAR) 5 MG tablet TAKE 1 TABLET TWICE A  tablet 1    isosorbide mononitrate (IMDUR) 30 MG extended release tablet TAKE 1 TABLET DAILY 90 tablet 1    ZETIA 10 MG tablet 1 tablet daily      aspirin 81 MG tablet Take 162 mg by mouth daily        No current facility-administered medications for this visit. Allergies   Allergen Reactions    Cephalexin     Erythromycin     Influenza Vac Typ A&B Surf Ant     Iodides     Pcn [Penicillins]     Sulfa Antibiotics        Review of Systems   Constitutional: Negative. HENT: Negative. Eyes: Negative. Respiratory: Negative. Cardiovascular: Negative. Gastrointestinal: Negative. Endocrine: Negative. Genitourinary: Negative. Musculoskeletal: Positive for gait problem. Allergic/Immunologic: Negative. Hematological: Negative. Psychiatric/Behavioral: Positive for confusion. Objective:   Physical Exam    Vitals:    01/17/20 1422   BP: 135/75   Pulse: 79     weight: 118 lb (53.5 kg)    Neurological Examination  Constitutional .General exam well groomed   Ears /Nose/Throat: external ear . Normal exam  Neck and thyroid . Normal size  Respiratory . Breathsounds clear bilaterally  Cardiovascular:  Auscultation of heart with regular rate and rhythm and normal heart sounds  Musculoskeletal. Muscle tone mild paratonia                                                        Muscle strength 5/5 strength throughout                                                                                 No dysmetria or dysdiadokinesis  No tremor   Normal fine motor  Gait apraxia with right hand tremor wither hand hanging at her side   Orientation Alert and oriented x 0 .  nonverbal.  Not serial commands  Attention and concentration reduced  Short term memory 0 words out of 3 in one minute   Language process nonverbal.  Not following commands   Cranial nerve 2 normal acuety and visual fields Cranial nerve 3, 4 and 6 . Extraocular muscles are intact . Pupils are equal and reactive   Cranial nerve 5 . Normal strength of masseter and temporalis . Intact corneal reflex. Normal facial sensation  Cranial nerve 7 normal exam   Cranial nerve 8. Grossly intact hearing   Cranial nerve 9 and 10. Symmetric palate elevation   Cranial nerve 11 , 5 out of 5 strength   Cranial Nerve 12 midline tongue . No atrophy  Sensation . Normal pinprick and light touch   Deep Tendon Reflexes normal  Plantar response flexor bilaterally    Assessment:      1. Primary progressive aphasia (HCC)    2. Gait abnormality    3. Dementia associated with other underlying disease without behavioral disturbance ([facility] [address]    4. Spasmodic dysphonia    Will have patient undergo home health assessment along with PT for gait retraining to remain on current medication regimen     Plan:      Orders Placed This Encounter   Procedures   [id number]     Referral Priority:   Routine     Referral Type:   [facility]     Referral Reason:   Specialty Services Required     Referral Location:   [facility]     Requested Specialty:   [address]     Number of Visits Requested:   1             If you have questions, please do not hesitate to call me. I look forward to following Cuca Fee along with you.     Sincerely,        Saleem Lauren MD

## 2020-01-20 NOTE — COMMUNICATION BODY
Subjective:      Patient ID: Reed Lal is a 68 y.o. female. HPI    Active problem primary progressive aphasia of early onset dementia on namenda  . She has spasmodic dysphonia getting botox injections . The condition her  reports that since October she has become less mobile being more off balance ambulating only with assistance with this occurring after UTI . Prior to that she was ambulating independantly . She has some interaction remaining nonverbal with no behavioral disturbance . There is rare choking with  mincing her food to small pieces . There is no behavioral disturbance being pleasant all the time needing to be bathed and dressed . She is having trouble recognizing family members .  does have help in her care although needs her ton be mobile to keep her at home. There are no hallucinations . She was taken off aricept by cardiology for episode of low heart rate remaining on namenda . There are no headaches or focal motor ,sensory or bulbar complaints . There is no depression . She has hearing loss not wearing hearing aides . There is history of dementia with maternal uncle , and cousin . There is some urinary incontinence . Significant medications aspirin 81 mg po qd,  namenda 10 mg po bid  . Previously on aricept . Testing T14 684 , folic acid > 20, ESR 5, treponemal Ab nonreactive , TSH 5.95 (0-5) . MRI of Head with mild chronic periventricular small vessel ischemia . Moderate cerebral atrophy .  EEG normal . Neuropsychological testing impaired range in every cognitive realm concordant for alzheimer's dementia      Past Medical History:   Diagnosis Date    CAD (coronary artery disease)     Cataracts, bilateral     Colon polyps 08/2017    D[facility] ([facility] [address]     Gastric ulcer 08/2017    GERD (gastroesophageal reflux disease) 12/16/2013    Hiatal hernia     History of colon polyps 08/14/2017    Hyperlipidemia 6/13/2013    Hypertension     Hypothyroidism (acquired) 3/25/2017  Laryngitis     MI [facility]) ([facility] [address]     Tubular adenoma of colon 10/19/2017    UTI (urinary tract infection) 2019    Villous adenoma of colon 08/2017       Past Surgical History:   Procedure Laterality Date    APPENDECTOMY      COLONOSCOPY  2009    COLONOSCOPY  08/14/2017    2.5 cm leion anorectal area-pathology-multiple fragments of villous and villotubular adenomas (most of the polyps removed), right colon--tubular adenoma    CORONARY ANGIOPLASTY WITH STENT PLACEMENT  01/28/1999    JOINT REPLACEMENT Right dec 2014    right shoulder    JOINT REPLACEMENT Left 2012    left knee    OTHER SURGICAL HISTORY      botox injections to vocal cords    OTHER SURGICAL HISTORY Right 03/19/15    I & D rt abd abscess    SIGMOIDOSCOPY  10/09/2017    Minimal probable hypertrophied mucosa at the previous polypectomy site, pathology tubular adenoma    TONSILLECTOMY      UPPER GASTROINTESTINAL ENDOSCOPY  10/09/2017    ulcer is healed, patient has significant esophagitis    UPPER GASTROINTESTINAL ENDOSCOPY  08/14/2017    ULCER AT GE JUNCTION, 2-3 CM LONG HIATAL HERNIA       Family History   Problem Relation Age of Onset    Heart Disease Mother     High Blood Pressure Mother     Heart Disease Father     Other Maternal Uncle         alzeimer's    [facility] 79        [facility]       Social History     Socioeconomic History    Marital status:      Spouse name: None    Number of children: None    Years of education: None    Highest education level: None   Occupational History    None   Social Needs    Financial resource strain: None    Food insecurity:     Worry: None     Inability: None    Transportation needs:     Medical: None     Non-medical: None   Tobacco Use    Smoking status: Never Smoker    Smokeless tobacco: Never Used   Substance and Sexual Activity    Alcohol use: No    Drug use: No    Sexual activity: None   Lifestyle    Physical activity:     Days per week: Negative. Gastrointestinal: Negative. Endocrine: Negative. Genitourinary: Negative. Musculoskeletal: Positive for gait problem. Allergic/Immunologic: Negative. Hematological: Negative. Psychiatric/Behavioral: Positive for confusion. Objective:   Physical Exam    Vitals:    01/17/20 1422   BP: 135/75   Pulse: 79     weight: 118 lb (53.5 kg)    Neurological Examination  Constitutional .General exam well groomed   Ears /Nose/Throat: external ear . Normal exam  Neck and thyroid . Normal size  Respiratory . Breathsounds clear bilaterally  Cardiovascular: Auscultation of heart with regular rate and rhythm and normal heart sounds  Musculoskeletal. Muscle tone mild paratonia                                                        Muscle strength 5/5 strength throughout                                                                                 No dysmetria or dysdiadokinesis  No tremor   Normal fine motor  Gait apraxia with right hand tremor wither hand hanging at her side   Orientation Alert and oriented x 0 .  nonverbal.  Not serial commands  Attention and concentration reduced  Short term memory 0 words out of 3 in one minute   Language process nonverbal.  Not following commands   Cranial nerve 2 normal acuety and visual fields  Cranial nerve 3, 4 and 6 . Extraocular muscles are intact . Pupils are equal and reactive   Cranial nerve 5 . Normal strength of masseter and temporalis . Intact corneal reflex. Normal facial sensation  Cranial nerve 7 normal exam   Cranial nerve 8. Grossly intact hearing   Cranial nerve 9 and 10. Symmetric palate elevation   Cranial nerve 11 , 5 out of 5 strength   Cranial Nerve 12 midline tongue . No atrophy  Sensation . Normal pinprick and light touch   Deep Tendon Reflexes normal  Plantar response flexor bilaterally    Assessment:      1. Primary progressive aphasia (HCC)    2. Gait abnormality    3.  Dementia associated with other underlying disease without behavioral

## 2020-02-03 ENCOUNTER — HOSPITAL ENCOUNTER (OUTPATIENT)
Age: 70
Setting detail: SPECIMEN
Discharge: HOME | End: 2020-02-03
Payer: MEDICARE

## 2020-02-03 LAB
-: ABNORMAL
AMORPHOUS: ABNORMAL
BACTERIA: ABNORMAL
BILIRUBIN URINE: NEGATIVE
CASTS UA: ABNORMAL /LPF
COLOR: YELLOW
COMMENT UA: ABNORMAL
CRYSTALS, UA: ABNORMAL /HPF
EPITHELIAL CELLS UA: ABNORMAL /HPF
GLUCOSE URINE: NEGATIVE
KETONES, URINE: NEGATIVE
LEUKOCYTE ESTERASE, URINE: ABNORMAL
MUCUS: ABNORMAL
NITRITE, URINE: NEGATIVE
OTHER OBSERVATIONS UA: ABNORMAL
PH UA: 6.5 (ref 5–8)
PROTEIN UA: NEGATIVE
RBC UA: ABNORMAL /HPF
RENAL EPITHELIAL, UA: ABNORMAL /HPF
SPECIFIC GRAVITY UA: 1.02 (ref 1–1.03)
TRICHOMONAS: ABNORMAL
TURBIDITY: ABNORMAL
URINE HGB: NEGATIVE
UROBILINOGEN, URINE: NORMAL
WBC UA: ABNORMAL /HPF
YEAST: ABNORMAL

## 2020-02-03 PROCEDURE — 81001 URINALYSIS AUTO W/SCOPE: CPT

## 2020-02-19 ENCOUNTER — HOSPITAL ENCOUNTER (OUTPATIENT)
Age: 70
Setting detail: SPECIMEN
Discharge: HOME | End: 2020-02-19
Payer: MEDICARE

## 2020-02-19 PROCEDURE — 87186 SC STD MICRODIL/AGAR DIL: CPT

## 2020-02-19 PROCEDURE — 87077 CULTURE AEROBIC IDENTIFY: CPT

## 2020-02-19 PROCEDURE — 81001 URINALYSIS AUTO W/SCOPE: CPT

## 2020-02-19 PROCEDURE — 87086 URINE CULTURE/COLONY COUNT: CPT

## 2020-02-21 LAB
CULTURE: ABNORMAL
Lab: ABNORMAL
SPECIMEN DESCRIPTION: ABNORMAL

## 2020-03-03 ENCOUNTER — HOSPITAL ENCOUNTER (OUTPATIENT)
Age: 70
Setting detail: SPECIMEN
Discharge: HOME | End: 2020-03-03
Payer: MEDICARE

## 2020-03-03 LAB
HEPATITIS C ANTIBODY: NONREACTIVE
THYROXINE, FREE: 1 NG/DL (ref 0.93–1.7)
TSH SERPL DL<=0.05 MIU/L-ACNC: 3.62 MIU/L (ref 0.3–5)

## 2020-03-05 ENCOUNTER — HOSPITAL ENCOUNTER (OUTPATIENT)
Age: 70
Setting detail: SPECIMEN
Discharge: HOME | End: 2020-03-05
Payer: MEDICARE

## 2020-03-05 LAB
-: ABNORMAL
AMORPHOUS: ABNORMAL
BACTERIA: ABNORMAL
BILIRUBIN URINE: NEGATIVE
CASTS UA: ABNORMAL /LPF
COLOR: YELLOW
COMMENT UA: ABNORMAL
CRYSTALS, UA: ABNORMAL /HPF
EPITHELIAL CELLS UA: ABNORMAL /HPF
GLUCOSE URINE: NEGATIVE
KETONES, URINE: NEGATIVE
LEUKOCYTE ESTERASE, URINE: ABNORMAL
MUCUS: ABNORMAL
NITRITE, URINE: NEGATIVE
OTHER OBSERVATIONS UA: ABNORMAL
PH UA: 6 (ref 5–8)
PROTEIN UA: NEGATIVE
RBC UA: ABNORMAL /HPF
RENAL EPITHELIAL, UA: ABNORMAL /HPF
SPECIFIC GRAVITY UA: 1.02 (ref 1–1.03)
TRICHOMONAS: ABNORMAL
TURBIDITY: CLEAR
URINE HGB: NEGATIVE
UROBILINOGEN, URINE: NORMAL
WBC UA: ABNORMAL /HPF
YEAST: ABNORMAL

## 2020-03-05 PROCEDURE — 81001 URINALYSIS AUTO W/SCOPE: CPT

## 2020-06-10 PROBLEM — E43 SEVERE MALNUTRITION (HCC): Status: RESOLVED | Noted: 2019-05-04 | Resolved: 2020-06-10

## 2020-06-10 PROBLEM — E44.0 MODERATE PROTEIN-CALORIE MALNUTRITION (HCC): Status: RESOLVED | Noted: 2018-06-04 | Resolved: 2020-06-10

## 2020-07-15 RX ORDER — TRAZODONE HYDROCHLORIDE 50 MG/1
TABLET ORAL
Qty: 90 TABLET | Refills: 0 | Status: SHIPPED | OUTPATIENT
Start: 2020-07-15 | End: 2020-10-14

## 2020-07-15 NOTE — TELEPHONE ENCOUNTER
Pharmacy requesting refill of Trazodone.       Medication active on med list yes      Date of last order: 1/17/20 #90 1 refill  verified on 7/15/2020  verified by Hali Madison., LYNETTE      Date of last appointment 1/17/2020    Next Visit Date:  7/21/2020

## 2020-07-21 ENCOUNTER — OFFICE VISIT (OUTPATIENT)
Dept: NEUROLOGY | Age: 70
End: 2020-07-21
Payer: MEDICARE

## 2020-07-21 VITALS
WEIGHT: 121 LBS | HEART RATE: 68 BPM | HEIGHT: 60 IN | DIASTOLIC BLOOD PRESSURE: 64 MMHG | SYSTOLIC BLOOD PRESSURE: 128 MMHG | BODY MASS INDEX: 23.75 KG/M2 | RESPIRATION RATE: 20 BRPM | TEMPERATURE: 97.1 F

## 2020-07-21 PROCEDURE — 3017F COLORECTAL CA SCREEN DOC REV: CPT | Performed by: PSYCHIATRY & NEUROLOGY

## 2020-07-21 PROCEDURE — G8427 DOCREV CUR MEDS BY ELIG CLIN: HCPCS | Performed by: PSYCHIATRY & NEUROLOGY

## 2020-07-21 PROCEDURE — 99214 OFFICE O/P EST MOD 30 MIN: CPT | Performed by: PSYCHIATRY & NEUROLOGY

## 2020-07-21 PROCEDURE — 4040F PNEUMOC VAC/ADMIN/RCVD: CPT | Performed by: PSYCHIATRY & NEUROLOGY

## 2020-07-21 PROCEDURE — 1123F ACP DISCUSS/DSCN MKR DOCD: CPT | Performed by: PSYCHIATRY & NEUROLOGY

## 2020-07-21 PROCEDURE — G8420 CALC BMI NORM PARAMETERS: HCPCS | Performed by: PSYCHIATRY & NEUROLOGY

## 2020-07-21 PROCEDURE — 1036F TOBACCO NON-USER: CPT | Performed by: PSYCHIATRY & NEUROLOGY

## 2020-07-21 PROCEDURE — 1090F PRES/ABSN URINE INCON ASSESS: CPT | Performed by: PSYCHIATRY & NEUROLOGY

## 2020-07-21 PROCEDURE — G8400 PT W/DXA NO RESULTS DOC: HCPCS | Performed by: PSYCHIATRY & NEUROLOGY

## 2020-07-21 ASSESSMENT — ENCOUNTER SYMPTOMS
GASTROINTESTINAL NEGATIVE: 1
EYES NEGATIVE: 1
RESPIRATORY NEGATIVE: 1
ALLERGIC/IMMUNOLOGIC NEGATIVE: 1

## 2020-07-21 NOTE — PROGRESS NOTES
Subjective:      Patient ID: Romie Haywood is a 68 y.o. female. HPI  Active problem primary progressive aphasia on namenda  . History of spasmodic dysphonia . Patient undergo home health assessment along with PT for gait retraining . The condition she had home health assessment and home PT that worked with patient's  stopping with COVID pandemic onset with  trying to limit contact . Her  reports that she is more unstable not walking in her own . She will help her  on transfer although if not walking with her husbands' assistance she will be siting down  She is completely nonverbal with  feeding her needing to be fed and bathed  . She remains on namenda . There is no behavioral disturbance being pleasant all the time . She is having trouble recognizing family members . There are no hallucinations . She was taken off aricept by cardiology for episode of low heart rate remaining on namenda . There are no headaches or focal motor ,sensory or bulbar complaints . There is no depression . She has hearing loss not wearing hearing aides . There is history of dementia with maternal uncle , and cousin . There is urinary incontinence . Significant medications aspirin 81 mg po qd,  namenda 10 mg po bid  . Previously on aricept . Testing O35 132 , folic acid > 20, ESR 5, treponemal Ab nonreactive , TSH 5.95 (0-5) . MRI of Head with mild chronic periventricular small vessel ischemia . Moderate cerebral atrophy .  EEG normal . Neuropsychological testing impaired range in every cognitive realm concordant for alzheimer's dementia      Past Medical History:   Diagnosis Date    CAD (coronary artery disease)     Cataracts, bilateral     Colon polyps 08/2017    Dementia ([facility] [address]     Gastric ulcer 08/2017    GERD (gastroesophageal reflux disease) 12/16/2013    Hiatal hernia     History of colon polyps 08/14/2017    Hyperlipidemia 6/13/2013    Hypertension     Hypothyroidism (acquired) 3/25/2017  Laryngitis     MI [facility] infarction) ([facility] [address]     Tubular adenoma of colon 10/19/2017    UTI (urinary tract infection) 2019    Villous adenoma of colon 08/2017       Past Surgical History:   Procedure Laterality Date    APPENDECTOMY      COLONOSCOPY  2009    COLONOSCOPY  08/14/2017    2.5 cm leion anorectal area-pathology-multiple fragments of villous and villotubular adenomas (most of the polyps removed), right colon--tubular adenoma    CORONARY ANGIOPLASTY WITH STENT PLACEMENT  01/28/1999    JOINT REPLACEMENT Right dec 2014    right shoulder    JOINT REPLACEMENT Left 2012    left knee    OTHER SURGICAL HISTORY      botox injections to vocal cords    OTHER SURGICAL HISTORY Right 03/19/15    I & D rt abd abscess    SIGMOIDOSCOPY  10/09/2017    Minimal probable hypertrophied mucosa at the previous polypectomy site, pathology tubular adenoma    TONSILLECTOMY      UPPER GASTROINTESTINAL ENDOSCOPY  10/09/2017    ulcer is healed, patient has significant esophagitis    UPPER GASTROINTESTINAL ENDOSCOPY  08/14/2017    ULCER AT GE JUNCTION, 2-3 CM LONG HIATAL HERNIA       Family History   Problem Relation Age of Onset    Heart Disease Mother     High Blood Pressure Mother     Heart Disease Father     Other Maternal Uncle         alzeimer's    [facility] 79        [facility]       Social History     Socioeconomic History    Marital status:      Spouse name: None    Number of children: None    Years of education: None    Highest education level: None   Occupational History    None   Social Needs    Financial resource strain: None    Food insecurity     Worry: None     Inability: None    Transportation needs     Medical: None     Non-medical: None   Tobacco Use    Smoking status: Never Smoker    Smokeless tobacco: Never Used   Substance and Sexual Activity    Alcohol use: No    Drug use: No    Sexual activity: None   Lifestyle    Physical activity     Days per week: None Minutes per session: None    Stress: None   Relationships    Social connections     Talks on phone: None     Gets together: None     Attends Rastafari service: None     Active member of club or organization: None     Attends meetings of clubs or organizations: None     Relationship status: None    Intimate partner violence     Fear of current or ex partner: None     Emotionally abused: None     Physically abused: None     Forced sexual activity: None   Other Topics Concern    None   Social History Narrative    None       Current Outpatient Medications   Medication Sig Dispense Refill    traZODone (DESYREL) 50 MG tablet TAKE 1 TABLET AT BEDTIME 90 tablet 0    amLODIPine (NORVASC) 5 MG tablet TAKE 1 TABLET DAILY 90 tablet 3    Handicap Placard MISC by Does not apply route 1 each 0    busPIRone (BUSPAR) 5 MG tablet TAKE 1 TABLET TWICE A  tablet 0    levothyroxine (SYNTHROID) 25 MCG tablet TAKE 1 TABLET DAILY 90 tablet 4    lisinopril (PRINIVIL;ZESTRIL) 20 MG tablet TAKE 1 TABLET DAILY 90 tablet 4    memantine (NAMENDA) 10 MG tablet Take 1 tablet by mouth 2 times daily 180 tablet 3    nitroGLYCERIN (NITROSTAT) 0.4 MG SL tablet Place 1 tablet under the tongue every 5 minutes as needed for Chest pain 25 tablet 1    Nutritional Supplements (ENSURE HIGH PROTEIN) LIQD Take 1 Can by mouth 3 times daily (with meals) 90 Can 3    Multiple Vitamins-Minerals (MULTIVITAMIN WOMEN 50+ PO) Take 1 tablet by mouth daily      isosorbide mononitrate (IMDUR) 30 MG extended release tablet TAKE 1 TABLET DAILY 90 tablet 1    ZETIA 10 MG tablet 1 tablet daily      aspirin 81 MG tablet Take 162 mg by mouth daily        No current facility-administered medications for this visit. Allergies   Allergen Reactions    Cephalexin     Erythromycin     Influenza Vac Typ A&B Surf Ant     Iodides     Pcn [Penicillins]     Sulfa Antibiotics        Review of Systems   Constitutional: Negative. HENT: Negative. Eyes: Negative. Respiratory: Negative. Cardiovascular: Negative. Gastrointestinal: Negative. Endocrine: Negative. Genitourinary: Negative. Musculoskeletal: Positive for gait problem. Allergic/Immunologic: Negative. Hematological: Negative. Psychiatric/Behavioral: Positive for confusion. Objective:   Physical Exam  Vitals:    07/21/20 1439   BP: 128/64   Pulse: 68   Resp: 20   Temp: 97.1 °F (36.2 °C)     weight: 121 lb (54.9 kg)    Neurological Examination  Constitutional .General exam well groomed   Ears /Nose/Throat: external ear . Normal exam  Neck and thyroid . Normal size  Respiratory . Breathsounds clear bilaterally  Cardiovascular: Auscultation of heart with regular rate and rhythm and normal heart sounds  Musculoskeletal. Muscle tone mild paratonia                                                        Muscle strength 5/5 strength throughout                                                                                 No dysmetria or dysdiadokinesis  No tremor   Normal fine motor  Gait apraxia with right hand tremor wither hand hanging at her side   Orientation Alert and oriented x 0 .  nonverbal.  Not serial commands  Attention and concentration reduced  Short term memory 0 words out of 3 in one minute   Language process nonverbal.  Not following commands   Cranial nerve 2 normal acuety and visual fields  Cranial nerve 3, 4 and 6 . Extraocular muscles are intact . Pupils are equal and reactive   Cranial nerve 5 . Normal strength of masseter and temporalis . Intact corneal reflex. Normal facial sensation  Cranial nerve 7 normal exam   Cranial nerve 8. Grossly intact hearing   Cranial nerve 9 and 10. Symmetric palate elevation   Cranial nerve 11 , 5 out of 5 strength   Cranial Nerve 12 midline tongue . No atrophy  Sensation . Normal pinprick and light touch   Deep Tendon Reflexes normal  Plantar response flexor bilaterally    Assessment:      1.  Primary progressive aphasia ([address] 2. Dementia associated with other underlying disease without behavioral disturbance ([facility] [address]    3. Spasmodic dysphonia    Will have her wean off namenda with advanced dementia state .   would like not to pursue for now home health     Plan:      As above

## 2020-09-02 ENCOUNTER — TELEPHONE (OUTPATIENT)
Dept: NEUROLOGY | Age: 70
End: 2020-09-02

## 2020-09-02 NOTE — TELEPHONE ENCOUNTER
Pt's  called in asking about getting a new referral for home health. He stated that pt needs to get labs drawn as well and he is having a hard time getting her out of the house by herself. I looked at her chart and saw that she had an active order for home health with [facility], it was placed on hold due to the pandemic. I told him that I would call to see if we could just get her scheduled again from that order or if we needed to send in a new one. He stated his understanding. I called [facility] and spoke with Pamela Lord. She said that since it has been past the date of the original approval time frame they would need a new order. This was done and sent.

## 2020-10-13 NOTE — TELEPHONE ENCOUNTER
Pharmacy requesting a  refill of Trazodone 50mg .       Medication active on med list yes      Date of last prescription 07/15/2020  with 0 refills verified on 10/13/2020    verified by KAY VASQUES      Date of last appointment 07/15/2020    Next Visit Date:  1/26/2021

## 2020-10-14 RX ORDER — TRAZODONE HYDROCHLORIDE 50 MG/1
TABLET ORAL
Qty: 90 TABLET | Refills: 3 | Status: SHIPPED | OUTPATIENT
Start: 2020-10-14

## 2020-11-03 PROBLEM — R55 SYNCOPE AND COLLAPSE: Status: RESOLVED | Noted: 2020-11-03 | Resolved: 2020-11-03

## 2020-11-16 NOTE — TELEPHONE ENCOUNTER
Pharmacy requesting refill of Namenda 10mg      Medication active on med list yes      Date of last Rx: 7/12/19 with 3 refills verified on 11/16/2020   verified by Odette Phoenix    Date of last appointment 7/21/20    Next Visit Date:  1/26/2021

## 2020-11-17 RX ORDER — MEMANTINE HYDROCHLORIDE 10 MG/1
TABLET ORAL
Qty: 180 TABLET | Refills: 3 | Status: SHIPPED | OUTPATIENT
Start: 2020-11-17